# Patient Record
Sex: FEMALE | Race: BLACK OR AFRICAN AMERICAN
[De-identification: names, ages, dates, MRNs, and addresses within clinical notes are randomized per-mention and may not be internally consistent; named-entity substitution may affect disease eponyms.]

---

## 2019-10-05 ENCOUNTER — HOSPITAL ENCOUNTER (EMERGENCY)
Dept: HOSPITAL 74 - JER | Age: 74
Discharge: HOME | End: 2019-10-05
Payer: COMMERCIAL

## 2019-10-05 VITALS — BODY MASS INDEX: 27.3 KG/M2

## 2019-10-05 VITALS — SYSTOLIC BLOOD PRESSURE: 182 MMHG | DIASTOLIC BLOOD PRESSURE: 82 MMHG | TEMPERATURE: 97.9 F | HEART RATE: 91 BPM
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
## 2019-10-05 DIAGNOSIS — M25.511: Primary | ICD-10-CM
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
## 2019-10-05 LAB
APPEARANCE UR: CLEAR
BILIRUB UR STRIP.AUTO-MCNC: NEGATIVE MG/DL
COLOR UR: YELLOW
KETONES UR QL STRIP: NEGATIVE
LEUKOCYTE ESTERASE UR QL STRIP.AUTO: NEGATIVE
NITRITE UR QL STRIP: NEGATIVE
PH UR: 5.5 [PH] (ref 5–8)
PROT UR QL STRIP: (no result)
PROT UR QL STRIP: (no result)
SP GR UR: 1.03 (ref 1.01–1.03)
UROBILINOGEN UR STRIP-MCNC: 0.2 MG/DL (ref 0.2–1)

## 2019-10-05 PROCEDURE — 3E0234Z INTRODUCTION OF SERUM, TOXOID AND VACCINE INTO MUSCLE, PERCUTANEOUS APPROACH: ICD-10-PCS

## 2019-10-05 NOTE — PDOC
History of Present Illness





- General


Chief Complaint: Pain, Acute


Stated Complaint: FALL ON RT SHOULDER


Time Seen by Provider: 10/05/19 11:41


History Source: Patient


Exam Limitations: No Limitations





Past History





- Travel


Traveled outside of the country in the last 30 days: No


Close contact w/someone who was outside of country & ill: No





- Past Medical History


Allergies/Adverse Reactions: 


 Allergies











Allergy/AdvReac Type Severity Reaction Status Date / Time


 


No Known Allergies Allergy   Verified 10/05/19 11:40














- Psycho Social/Smoking Cessation Hx


Smoking History: Never smoked


Hx Alcohol Use: No


Drug/Substance Use Hx: No





**Review of Systems





- Review of Systems


Able to Perform ROS?: Yes


Comments:: 





10/05/19 14:42


CONSTITUTIONAL: 


Absent: fever, chills, diaphoresis, generalized weakness, malaise, loss of 

appetite


HEENT: 


Absent: rhinorrhea, nasal congestion, throat pain, throat swelling, difficulty 

swallowing, mouth swelling, ear pain, eye pain, visual Changes


CARDIOVASCULAR: 


Absent: chest pain, loss of consciousness, palpitations, irregular heart rate, 

peripheral edema


RESPIRATORY: 


Absent: cough, shortness of breath, dyspnea with exertion, orthopnea, wheezing, 

stridor, hemoptysis


GASTROINTESTINAL:


Absent: abdominal pain, abdominal distension, nausea, vomiting, diarrhea, 

constipation, melena, hematochezia


GENITOURINARY: 


Absent: dysuria, frequency, urgency, hesitancy, hematuria, flank pain, genital 

pain


MUSCULOSKELETAL: 


Absent: myalgia, arthralgia, joint swelling


SKIN: 


Absent: rash, itching, pallor


HEMATOLOGIC/IMMUNOLOGIC: 


Absent: easy bleeding, easy bruising, lymphadenopathy, frequent infections


ENDOCRINE:


Absent: unexplained weight gain, unexplained weight loss, heat intolerance, 

cold intolerance


NEUROLOGIC: 


Absent: headache, focal weakness or paresthesias, dizziness, unsteady gait, 

seizure, mental status changes, bladder or bowel incontinence


PSYCHIATRIC: 


Absent: anxiety, depression, suicidal or homicidal ideation, hallucinations.


Is the patient limited English proficient: No





*Physical Exam





- Vital Signs


 Last Vital Signs











Temp Pulse Resp BP Pulse Ox


 


 97.9 F   91 H  16   182/82 H  98 


 


 10/05/19 11:40  10/05/19 11:40  10/05/19 11:40  10/05/19 11:40  10/05/19 11:40














ED Treatment Course





- RADIOLOGY


Radiology Studies Ordered: 














 Category Date Time Status


 


 HEAD CT WITHOUT CONTRAST [CT] Stat CT Scan  10/05/19 12:33 Ordered


 


 SHOULDER W/TRANS-RIGHT [RAD] Stat Radiology  10/05/19 11:46 Completed














Discharge





- Discharge Information


Problems reviewed: Yes


Clinical Impression/Diagnosis: 


Shoulder pain


Qualifiers:


 Chronicity: acute Laterality: right Qualified Code(s): M25.511 - Pain in right 

shoulder





Condition: Stable


Disposition: HOME





- Admission


No





- Follow up/Referral


Referrals: 


Unruly Leyva MD [Primary Care Provider] - 





- Patient Discharge Instructions


Patient Printed Discharge Instructions:  DI for Shoulder Pain


Additional Instructions: 


You were evaluated for your fall today.


Your CAT scan and urine were normal today.


Your x-ray of your right shoulder also did not show any broken bones and is 

normal.


I suspect she may have injured her rotator cuff.


Please take Tylenol 650 mg every 4 hours for pain.  Do not take more than 4000 

mg a day.


You may wear the sling as needed for comfort


Please follow-up with orthopedics in 2 to 3 days.  A referral has been provided 

for you.





Return to the ER for worsening pain, lightheadedness, dizziness or if you have 

any changes in your symptoms.





- Post Discharge Activity

## 2022-05-25 ENCOUNTER — INPATIENT (INPATIENT)
Facility: HOSPITAL | Age: 77
LOS: 1 days | Discharge: HOME | DRG: 244 | End: 2022-05-27
Attending: INTERNAL MEDICINE | Admitting: INTERNAL MEDICINE
Payer: MEDICARE

## 2022-05-25 VITALS
HEIGHT: 60 IN | DIASTOLIC BLOOD PRESSURE: 74 MMHG | TEMPERATURE: 98 F | HEART RATE: 44 BPM | SYSTOLIC BLOOD PRESSURE: 229 MMHG | OXYGEN SATURATION: 100 % | WEIGHT: 130.07 LBS | RESPIRATION RATE: 16 BRPM

## 2022-05-25 DIAGNOSIS — E78.5 HYPERLIPIDEMIA, UNSPECIFIED: ICD-10-CM

## 2022-05-25 DIAGNOSIS — D64.9 ANEMIA, UNSPECIFIED: ICD-10-CM

## 2022-05-25 DIAGNOSIS — R63.0 ANOREXIA: ICD-10-CM

## 2022-05-25 DIAGNOSIS — I44.1 ATRIOVENTRICULAR BLOCK, SECOND DEGREE: ICD-10-CM

## 2022-05-25 DIAGNOSIS — E11.9 TYPE 2 DIABETES MELLITUS WITHOUT COMPLICATIONS: ICD-10-CM

## 2022-05-25 DIAGNOSIS — I10 ESSENTIAL (PRIMARY) HYPERTENSION: ICD-10-CM

## 2022-05-25 PROBLEM — Z00.00 ENCOUNTER FOR PREVENTIVE HEALTH EXAMINATION: Status: ACTIVE | Noted: 2022-05-25

## 2022-05-25 LAB
ALBUMIN SERPL ELPH-MCNC: 3.6 G/DL — SIGNIFICANT CHANGE UP (ref 3.3–5)
ALP SERPL-CCNC: 68 U/L — SIGNIFICANT CHANGE UP (ref 40–120)
ALT FLD-CCNC: 13 U/L — SIGNIFICANT CHANGE UP (ref 10–45)
ANION GAP SERPL CALC-SCNC: 10 MMOL/L — SIGNIFICANT CHANGE UP (ref 5–17)
APTT BLD: 27.3 SEC — LOW (ref 27.5–35.5)
AST SERPL-CCNC: 24 U/L — SIGNIFICANT CHANGE UP (ref 10–40)
BASOPHILS # BLD AUTO: 0.01 K/UL — SIGNIFICANT CHANGE UP (ref 0–0.2)
BASOPHILS NFR BLD AUTO: 0.2 % — SIGNIFICANT CHANGE UP (ref 0–2)
BILIRUB SERPL-MCNC: 0.2 MG/DL — SIGNIFICANT CHANGE UP (ref 0.2–1.2)
BUN SERPL-MCNC: 17 MG/DL — SIGNIFICANT CHANGE UP (ref 7–23)
CALCIUM SERPL-MCNC: 9.8 MG/DL — SIGNIFICANT CHANGE UP (ref 8.4–10.5)
CHLORIDE SERPL-SCNC: 105 MMOL/L — SIGNIFICANT CHANGE UP (ref 96–108)
CK SERPL-CCNC: 240 U/L — HIGH (ref 25–170)
CO2 SERPL-SCNC: 23 MMOL/L — SIGNIFICANT CHANGE UP (ref 22–31)
CREAT SERPL-MCNC: 1.2 MG/DL — SIGNIFICANT CHANGE UP (ref 0.5–1.3)
EGFR: 47 ML/MIN/1.73M2 — LOW
EOSINOPHIL # BLD AUTO: 0.08 K/UL — SIGNIFICANT CHANGE UP (ref 0–0.5)
EOSINOPHIL NFR BLD AUTO: 1.3 % — SIGNIFICANT CHANGE UP (ref 0–6)
GLUCOSE BLDC GLUCOMTR-MCNC: 127 MG/DL — HIGH (ref 70–99)
GLUCOSE BLDC GLUCOMTR-MCNC: 88 MG/DL — SIGNIFICANT CHANGE UP (ref 70–99)
GLUCOSE SERPL-MCNC: 130 MG/DL — HIGH (ref 70–99)
HCT VFR BLD CALC: 33.4 % — LOW (ref 34.5–45)
HGB BLD-MCNC: 10.7 G/DL — LOW (ref 11.5–15.5)
IMM GRANULOCYTES NFR BLD AUTO: 0.2 % — SIGNIFICANT CHANGE UP (ref 0–1.5)
INR BLD: 1.12 — SIGNIFICANT CHANGE UP (ref 0.88–1.16)
LYMPHOCYTES # BLD AUTO: 2.53 K/UL — SIGNIFICANT CHANGE UP (ref 1–3.3)
LYMPHOCYTES # BLD AUTO: 41.2 % — SIGNIFICANT CHANGE UP (ref 13–44)
MCHC RBC-ENTMCNC: 29.3 PG — SIGNIFICANT CHANGE UP (ref 27–34)
MCHC RBC-ENTMCNC: 32 GM/DL — SIGNIFICANT CHANGE UP (ref 32–36)
MCV RBC AUTO: 91.5 FL — SIGNIFICANT CHANGE UP (ref 80–100)
MONOCYTES # BLD AUTO: 0.63 K/UL — SIGNIFICANT CHANGE UP (ref 0–0.9)
MONOCYTES NFR BLD AUTO: 10.3 % — SIGNIFICANT CHANGE UP (ref 2–14)
NEUTROPHILS # BLD AUTO: 2.88 K/UL — SIGNIFICANT CHANGE UP (ref 1.8–7.4)
NEUTROPHILS NFR BLD AUTO: 46.8 % — SIGNIFICANT CHANGE UP (ref 43–77)
NRBC # BLD: 0 /100 WBCS — SIGNIFICANT CHANGE UP (ref 0–0)
NT-PROBNP SERPL-SCNC: 780 PG/ML — HIGH (ref 0–300)
PLATELET # BLD AUTO: 224 K/UL — SIGNIFICANT CHANGE UP (ref 150–400)
POTASSIUM SERPL-MCNC: 4.4 MMOL/L — SIGNIFICANT CHANGE UP (ref 3.5–5.3)
POTASSIUM SERPL-SCNC: 4.4 MMOL/L — SIGNIFICANT CHANGE UP (ref 3.5–5.3)
PROT SERPL-MCNC: 7.5 G/DL — SIGNIFICANT CHANGE UP (ref 6–8.3)
PROTHROM AB SERPL-ACNC: 13.3 SEC — SIGNIFICANT CHANGE UP (ref 10.5–13.4)
RBC # BLD: 3.65 M/UL — LOW (ref 3.8–5.2)
RBC # FLD: 12.9 % — SIGNIFICANT CHANGE UP (ref 10.3–14.5)
SARS-COV-2 RNA SPEC QL NAA+PROBE: NEGATIVE — SIGNIFICANT CHANGE UP
SODIUM SERPL-SCNC: 138 MMOL/L — SIGNIFICANT CHANGE UP (ref 135–145)
TROPONIN T SERPL-MCNC: 0.01 NG/ML — SIGNIFICANT CHANGE UP (ref 0–0.01)
WBC # BLD: 6.14 K/UL — SIGNIFICANT CHANGE UP (ref 3.8–10.5)
WBC # FLD AUTO: 6.14 K/UL — SIGNIFICANT CHANGE UP (ref 3.8–10.5)

## 2022-05-25 PROCEDURE — 99285 EMERGENCY DEPT VISIT HI MDM: CPT | Mod: 25

## 2022-05-25 PROCEDURE — 71045 X-RAY EXAM CHEST 1 VIEW: CPT | Mod: 26

## 2022-05-25 PROCEDURE — 99222 1ST HOSP IP/OBS MODERATE 55: CPT

## 2022-05-25 PROCEDURE — 93010 ELECTROCARDIOGRAM REPORT: CPT

## 2022-05-25 RX ORDER — SEMAGLUTIDE 0.68 MG/ML
1 INJECTION, SOLUTION SUBCUTANEOUS
Qty: 0 | Refills: 0 | DISCHARGE

## 2022-05-25 RX ORDER — MIRTAZAPINE 45 MG/1
7.5 TABLET, ORALLY DISINTEGRATING ORAL AT BEDTIME
Refills: 0 | Status: DISCONTINUED | OUTPATIENT
Start: 2022-05-25 | End: 2022-05-27

## 2022-05-25 RX ORDER — INSULIN GLARGINE 100 [IU]/ML
15 INJECTION, SOLUTION SUBCUTANEOUS AT BEDTIME
Refills: 0 | Status: DISCONTINUED | OUTPATIENT
Start: 2022-05-25 | End: 2022-05-27

## 2022-05-25 RX ORDER — MIRTAZAPINE 45 MG/1
7.5 TABLET, ORALLY DISINTEGRATING ORAL AT BEDTIME
Refills: 0 | Status: DISCONTINUED | OUTPATIENT
Start: 2022-05-25 | End: 2022-05-25

## 2022-05-25 RX ORDER — DEXTROSE 50 % IN WATER 50 %
25 SYRINGE (ML) INTRAVENOUS ONCE
Refills: 0 | Status: DISCONTINUED | OUTPATIENT
Start: 2022-05-25 | End: 2022-05-27

## 2022-05-25 RX ORDER — MIRTAZAPINE 45 MG/1
1 TABLET, ORALLY DISINTEGRATING ORAL
Qty: 0 | Refills: 0 | DISCHARGE

## 2022-05-25 RX ORDER — DEXTROSE 50 % IN WATER 50 %
12.5 SYRINGE (ML) INTRAVENOUS ONCE
Refills: 0 | Status: DISCONTINUED | OUTPATIENT
Start: 2022-05-25 | End: 2022-05-27

## 2022-05-25 RX ORDER — ASPIRIN/CALCIUM CARB/MAGNESIUM 324 MG
81 TABLET ORAL DAILY
Refills: 0 | Status: DISCONTINUED | OUTPATIENT
Start: 2022-05-25 | End: 2022-05-27

## 2022-05-25 RX ORDER — LOSARTAN POTASSIUM 100 MG/1
100 TABLET, FILM COATED ORAL EVERY 24 HOURS
Refills: 0 | Status: DISCONTINUED | OUTPATIENT
Start: 2022-05-25 | End: 2022-05-27

## 2022-05-25 RX ORDER — GLUCAGON INJECTION, SOLUTION 0.5 MG/.1ML
1 INJECTION, SOLUTION SUBCUTANEOUS ONCE
Refills: 0 | Status: DISCONTINUED | OUTPATIENT
Start: 2022-05-25 | End: 2022-05-27

## 2022-05-25 RX ORDER — ATORVASTATIN CALCIUM 80 MG/1
10 TABLET, FILM COATED ORAL AT BEDTIME
Refills: 0 | Status: DISCONTINUED | OUTPATIENT
Start: 2022-05-25 | End: 2022-05-27

## 2022-05-25 RX ORDER — EZETIMIBE 10 MG/1
1 TABLET ORAL
Qty: 0 | Refills: 0 | DISCHARGE

## 2022-05-25 RX ORDER — INSULIN GLARGINE 100 [IU]/ML
30 INJECTION, SOLUTION SUBCUTANEOUS
Qty: 0 | Refills: 0 | DISCHARGE

## 2022-05-25 RX ORDER — SITAGLIPTIN AND METFORMIN HYDROCHLORIDE 500; 50 MG/1; MG/1
1 TABLET, FILM COATED ORAL
Qty: 0 | Refills: 0 | DISCHARGE

## 2022-05-25 RX ORDER — MIRTAZAPINE 45 MG/1
7.5 TABLET, ORALLY DISINTEGRATING ORAL DAILY
Refills: 0 | Status: DISCONTINUED | OUTPATIENT
Start: 2022-05-25 | End: 2022-05-25

## 2022-05-25 RX ORDER — AMLODIPINE BESYLATE 2.5 MG/1
10 TABLET ORAL DAILY
Refills: 0 | Status: DISCONTINUED | OUTPATIENT
Start: 2022-05-25 | End: 2022-05-25

## 2022-05-25 RX ORDER — OXYBUTYNIN CHLORIDE 5 MG
1 TABLET ORAL
Qty: 0 | Refills: 0 | DISCHARGE

## 2022-05-25 RX ORDER — DEXTROSE 50 % IN WATER 50 %
15 SYRINGE (ML) INTRAVENOUS ONCE
Refills: 0 | Status: DISCONTINUED | OUTPATIENT
Start: 2022-05-25 | End: 2022-05-27

## 2022-05-25 RX ORDER — SODIUM CHLORIDE 9 MG/ML
1000 INJECTION, SOLUTION INTRAVENOUS
Refills: 0 | Status: DISCONTINUED | OUTPATIENT
Start: 2022-05-25 | End: 2022-05-27

## 2022-05-25 RX ORDER — INSULIN LISPRO 100/ML
VIAL (ML) SUBCUTANEOUS
Refills: 0 | Status: DISCONTINUED | OUTPATIENT
Start: 2022-05-25 | End: 2022-05-27

## 2022-05-25 RX ORDER — ASPIRIN/CALCIUM CARB/MAGNESIUM 324 MG
1 TABLET ORAL
Qty: 0 | Refills: 0 | DISCHARGE

## 2022-05-25 RX ORDER — OXYBUTYNIN CHLORIDE 5 MG
5 TABLET ORAL DAILY
Refills: 0 | Status: DISCONTINUED | OUTPATIENT
Start: 2022-05-25 | End: 2022-05-27

## 2022-05-25 RX ORDER — HYDROCHLOROTHIAZIDE 25 MG
25 TABLET ORAL EVERY 24 HOURS
Refills: 0 | Status: DISCONTINUED | OUTPATIENT
Start: 2022-05-25 | End: 2022-05-27

## 2022-05-25 RX ORDER — LOSARTAN/HYDROCHLOROTHIAZIDE 100MG-25MG
1 TABLET ORAL
Qty: 0 | Refills: 0 | DISCHARGE

## 2022-05-25 RX ADMIN — AMLODIPINE BESYLATE 10 MILLIGRAM(S): 2.5 TABLET ORAL at 15:46

## 2022-05-25 RX ADMIN — INSULIN GLARGINE 15 UNIT(S): 100 INJECTION, SOLUTION SUBCUTANEOUS at 22:52

## 2022-05-25 RX ADMIN — ATORVASTATIN CALCIUM 10 MILLIGRAM(S): 80 TABLET, FILM COATED ORAL at 22:52

## 2022-05-25 RX ADMIN — Medication 25 MILLIGRAM(S): at 16:52

## 2022-05-25 RX ADMIN — Medication 5 MILLIGRAM(S): at 16:50

## 2022-05-25 RX ADMIN — Medication 81 MILLIGRAM(S): at 16:50

## 2022-05-25 RX ADMIN — LOSARTAN POTASSIUM 100 MILLIGRAM(S): 100 TABLET, FILM COATED ORAL at 17:03

## 2022-05-25 RX ADMIN — MIRTAZAPINE 7.5 MILLIGRAM(S): 45 TABLET, ORALLY DISINTEGRATING ORAL at 22:52

## 2022-05-25 NOTE — H&P ADULT - PROBLEM SELECTOR PLAN 5
H/H notable for 10.7/33.4 -- denies melena, hematuria, BRBPR  -AM iron studies, B12, folate  -Active T&S

## 2022-05-25 NOTE — H&P ADULT - PROBLEM SELECTOR PLAN 6
Pt endorsing loss of appetite over the past year with 20lb weight loss  -PCP aware and prescribed Mirtazapine 7.5mg QD for appetite stimulation however pt ran out  -Nutrition consult  -C/w Mirtazapine on admission     DVT PPX: SCDs  Dispo: pending PPM placement  Case d/w Dr. Mckeon

## 2022-05-25 NOTE — ED ADULT NURSE NOTE - OBJECTIVE STATEMENT
pt. a&ox4 ambulatory sent into ED by cardiologist after halter monitor placed on left chest wall yesterday for monitoring. Pt. reports she was at her pcp office for routine  checkup yesterday, pt. was told her hr was slower than baseline, sent her to cardiologist, where they placed halter, and called her this am after seeing abnormal activity. Pt. denies s/s of dizziness, lightheadedness, fatigue / weakness, sob, cp, palpitations, headache, heartburn, abdominal pain. Pt. denies n/v/d, f/c. Pt. airway patent, breathing spontaneous and unlabored. Pt. UG to MD sales, pt. placed on ccm, pulseox. Pt. hypertensive @ triage, denies taking her medications this am because of the call from the cardiologist. Pt. reports compliance qd otherwise.

## 2022-05-25 NOTE — ED ADULT TRIAGE NOTE - CHIEF COMPLAINT QUOTE
Pt sent by MD for low heart rate, pt denies any symptoms, no chest pain, sob, dizziness, nausea. Hx of HTN, did not take meds today, denies headache.

## 2022-05-25 NOTE — H&P ADULT - PROBLEM SELECTOR PLAN 2
Pt with elevated BPs to 229/74 initially in ER - did not take any meds today AM  -S/p Amlodipine 10mg x1 in ER   -C/w home Losartan 100mg, HCTZ 25mg QD and additional Amlodipine 10mg QD Pt with elevated BPs to 229/74 initially in ER - did not take any meds today AM  -S/p Amlodipine 10mg x1 in ER   -C/w home Losartan 100mg, HCTZ 25mg QD. Can order additional Amlodipine 10mg QD if she continues to be hypertensive

## 2022-05-25 NOTE — H&P ADULT - PROBLEM SELECTOR PLAN 4
Holding home Janumet 50-1000mg BID and Ozempic on admission  -C/w MARQUIS and (1/2) home Lantus 15U QD  -AM A1c

## 2022-05-25 NOTE — PATIENT PROFILE ADULT - NSPROREFERSVCHOMEDIABETES_GEN_A_NUR
Patient presented to [address] [place] [address] ED in full cardiac arrest (occurred within a minute of arrival to EMS bay), patient had recently deteriorated and was brought in by EMS for initial complaint of SOB and syncopal episode. no

## 2022-05-25 NOTE — ED PROVIDER NOTE - CPE EDP EYES NORM
I would recommend you begin ranitidine (Zantac) 150mg orally twice daily.  Take your first dose this evening.  Please also begin the omeprazole 20mg once daily,  This is taken in the morning on an empty stomach.   After 4 days or so you can stop the ranitidine and continue the omeprazole.    Please also call and schedule the endoscopy for next week.    I placed a new referral for GYN to address the pain with intercourse.  I also refilled the clobetasol, please restart that.    If you feel your symptoms change or worsen please do not hesitate to seek additional emergent care.       normal...

## 2022-05-25 NOTE — ED ADULT NURSE REASSESSMENT NOTE - NS ED NURSE REASSESS COMMENT FT1
pt. HR ranging between 37-40 bpm as seen on ccm. Pt. placed on Zoll monitor and acls pads. MSD mónica aware. Pt. resting comfortably, denies s/s of dizziness, lightheadedness, fatigue, palpitations, diaphoresis, cp. sob. will continue to assess.

## 2022-05-25 NOTE — H&P ADULT - ASSESSMENT
78 yo F with a PMH of HTN, HLD, DM who presented to [facility] ED after worsening RAMIREZ/fatigue with Holter monitor reveling second degree type II heart block with HRs upper 30s-40s. Pt states she is currently and has always been ASX, however EP consulted with plan for admission to cardiac tele and PPM in AM.

## 2022-05-25 NOTE — PATIENT PROFILE ADULT - FALL HARM RISK - HARM RISK INTERVENTIONS

## 2022-05-25 NOTE — H&P ADULT - PSYCHIATRIC
Affect and characteristics of appearance, verbalizations, behaviors are appropriate PAST SURGICAL HISTORY:  S/P angioplasty with stent 1 stent, RCA    Status Post Cardiac Catheterization

## 2022-05-25 NOTE — CONSULT NOTE ADULT - SUBJECTIVE AND OBJECTIVE BOX
HPI:    Ms. Rose is a 77 yo F with HTN, DM, and HLD who was recently given an event monitor for bradycardia, recording noted to be in 2:1 AV block with rates 30-40s. She was encouraged to come to ED because of this finding, she currently denies chest discomfort or pain, and SOB. Pt endorses increased fatigue with exertion and h/o dizziness. Her BP is 220/90s in the ED, denies HA, blurry vision. EP consulted for PPM evaluation.    EKG in the ED shows her to be in a 2:1 AV block @ 41 bpm. Tele shows her rates ranging 30-50s bpm. SBPs have been in 200s. She endorses bilateral ankle and foot edema.    Of note, she was seen by EP on 21 for which she was given a home monitor and was noted to have an asymptomatic intermittent 2:1 heart block- wenkebach at 3am and 6 am (hours of sleep). Recommendation at that time was to repeat a monitor 6 months.      PAST MEDICAL & SURGICAL HISTORY:  HTN  DM  HLD    Social History:   no smoking, no drugs, no alcohol    Home medications:  Unsure of which medications she takes; she showed me a list: losartan, insulin, atorvastatin, zetia, ASA listed-- admits to not taking meds recently.    Inpatient Medications:   none in system    Allergies: No Known Allergies    ROS:   CONSTITUTIONAL: No fever, weight loss + fatigue  EYES: Pt denies  RESPIRATORY: No cough, wheezing, chills or hemoptysis; No Shortness of Breath  CARDIOVASCULAR: see HPI  GASTROINTESTINAL: Pt denies  NEUROLOGICAL: Pt denies  SKIN: Pt denies   PSYCHIATRIC: Pt denies  HEME/LYMPH: Pt denies    PHYSICAL:  T(C): 37.1 (22 @ 13:43), Max: 37.1 (22 @ 13:43)  HR: 38 (22 @ 13:43) (38 - 44)  BP: 205/86 (22 @ 13:40) (205/86 - 229/74)  RR: 18 (22 @ 13:43) (16 - 18)  SpO2: 100% (22 @ 13:43) (100% - 100%)    Appearance: No acute distress, well developed  Eyes: normal appearing conjunctiva, pupils and eyelids  Cardiovascular: Normal S1 S2, No JVD, No murmurs, No edema  Respiratory: Lungs clear to auscultation	bilaterally.  No wheeze, rhonchi, rales noted  Gastrointestinal:  Soft, NT/ND 	  Neurologic:  No deficit noted  Psych: A&Ox3, normal mood/affect  Musculoskeletal: normal gait  Skin: no rash noted, normal color and pigmentation.      LABS:                      10.7   6.14  )-----------( 224      ( 25 May 2022 13:03 )             33.4   138  |  105  |  17  ----------------------------<  130<H>  4.4   |  23  |  1.20    Ca    9.8      25 May 2022 13:03    TPro  7.5  /  Alb  3.6  /  TBili  0.2  /  DBili  x   /  AST  24  /  ALT  13  /  AlkPhos  68  05-25    PT/INR - ( 25 May 2022 13:03 )   PT: 13.3 sec;   INR: 1.12     PTT - ( 25 May 2022 13:03 )  PTT:27.3 sec  Troponin T, Serum (22 @ 13:03) Serum: 0.01:   EK22: 2:1 AV block @ 41 bpm    Telemetry: 2:1 AV block 30-50s    ECHO: none    Prior EP procedures: none    Cath / stress / Cardiac CTa: none    Assessment & Plan:  Ms. Rose is a 77 yo F with HTN, DM, and HLD who was recently given an event monitor for bradycardia, recording noted to be in 2:1 AV block with rates 30-40s. She was encouraged to come to ED now w/ c/o increased fatigue with exertion and h/o dizziness. Her BP is 220/90s in the ED, denies HA, blurry vision. EP consulted for PPM evaluation.    - admit to cardiology to manage HTN  - Likely dual chamber PPM tomorrow  - Keep NPO after Midnight  - covid swab  - needs TTE     HPI:    Ms. Rose is a 77 yo F with HTN, DM, and HLD who was recently given an event monitor for bradycardia, recording noted to be in 2:1 AV block with rates 30-40s. She was encouraged to come to ED because of this finding, she currently denies chest discomfort or pain, and SOB. Pt endorses increased fatigue with exertion and h/o dizziness. Her BP is 220/90s in the ED, denies HA, blurry vision. EP consulted for PPM evaluation.    EKG in the ED shows her to be in a 2:1 AV block @ 41 bpm. Tele shows her rates ranging 30-50s bpm. SBPs have been in 200s. She endorses bilateral ankle and foot edema.    Of note, she was seen by EP on 21 for which she was given a home monitor and was noted to have an asymptomatic intermittent 2:1 heart block- wenkebach at 3am and 6 am (hours of sleep). Recommendation at that time was to repeat a monitor 6 months.      PAST MEDICAL & SURGICAL HISTORY:  HTN  DM  HLD    Social History:   no smoking, no drugs, no alcohol    Home medications:  Unsure of which medications she takes; she showed me a list: losartan, insulin, atorvastatin, zetia, ASA listed-- admits to not taking meds recently.    Inpatient Medications:   none in system    Allergies: No Known Allergies    ROS:   CONSTITUTIONAL: No fever, weight loss + fatigue  EYES: Pt denies  RESPIRATORY: No cough, wheezing, chills or hemoptysis; No Shortness of Breath  CARDIOVASCULAR: see HPI  GASTROINTESTINAL: Pt denies  NEUROLOGICAL: Pt denies  SKIN: Pt denies   PSYCHIATRIC: Pt denies  HEME/LYMPH: Pt denies    PHYSICAL:  T(C): 37.1 (22 @ 13:43), Max: 37.1 (22 @ 13:43)  HR: 38 (22 @ 13:43) (38 - 44)  BP: 205/86 (22 @ 13:40) (205/86 - 229/74)  RR: 18 (22 @ 13:43) (16 - 18)  SpO2: 100% (22 @ 13:43) (100% - 100%)    Appearance: No acute distress, well developed  Eyes: normal appearing conjunctiva, pupils and eyelids  Cardiovascular: Normal S1 S2, No JVD, No murmurs, No edema  Respiratory: Lungs clear to auscultation	bilaterally.  No wheeze, rhonchi, rales noted  Gastrointestinal:  Soft, NT/ND 	  Neurologic:  No deficit noted  Psych: A&Ox3, normal mood/affect  Musculoskeletal: normal gait  Skin: no rash noted, normal color and pigmentation.      LABS:                      10.7   6.14  )-----------( 224      ( 25 May 2022 13:03 )             33.4   138  |  105  |  17  ----------------------------<  130<H>  4.4   |  23  |  1.20    Ca    9.8      25 May 2022 13:03    TPro  7.5  /  Alb  3.6  /  TBili  0.2  /  DBili  x   /  AST  24  /  ALT  13  /  AlkPhos  68  05-25    PT/INR - ( 25 May 2022 13:03 )   PT: 13.3 sec;   INR: 1.12     PTT - ( 25 May 2022 13:03 )  PTT:27.3 sec  Troponin T, Serum (22 @ 13:03) Serum: 0.01:   EK22: 2:1 AV block @ 41 bpm    Telemetry: 2:1 AV block 30-50s    ECHO: none    Prior EP procedures: none    Cath / stress / Cardiac CTa: none    Assessment & Plan:  Ms. Rose is a 77 yo F with HTN, DM, and HLD who was recently given an event monitor for bradycardia, recording noted to be in 2:1 AV block with rates 30-40s. She was encouraged to come to ED now w/ c/o increased fatigue with exertion and h/o dizziness. Her BP is 220/90s in the ED, denies HA, blurry vision. EP consulted for PPM evaluation. Given 2:1 block <40 bpm pt meets criteria for PPM    - admit to cardiology to manage HTN  - Likely dual chamber PPM tomorrow  - Keep NPO after Midnight  - covid swab  - needs TTE

## 2022-05-25 NOTE — H&P ADULT - HISTORY OF PRESENT ILLNESS
Cardiologist: none  Pharmacy: [facility] verified w/ patient's list and cross referenced w/ [employer] (reliable)     78 yo F with a PMH of HTN, HLD, DM who presented to [facility] ED after abnormal Holter monitor results. Pt was referred to outpatient EP Dr. Mccord for bradycardia, ?syncope and unspecified heart block on ECG. Holter monitor at that time (4/2021) revealed Wenckebach and AV juan blocking agents held. Per MD note, patient continued f/u with PCP and endorsed worsening fatigue and RAMIREZ for which repeat Holter conducted. Holter 5/2022 was significant for second degree type II heart block with HRs upper 30s-40s. Pt states she is currently and has always been ASX, however was referred to ER by PCP for EP eval.    In the ER, /74, HR 44, RR 16, SpO2 100% RA. Labs significant for H/H 10.7/33.4, , Troponin negative x1. CXR with no acute changes. ECG: SR @ 41bpm with 2nd degree type II heart block (). EP consulted, plan for admission to cardiac tele with plan for PPM in AM.

## 2022-05-25 NOTE — H&P ADULT - PROBLEM SELECTOR PLAN 1
Per MD, endorsing worsening RAMIREZ/fatigue over past few weeks - pt states shes ASX  -Holter Monitor 4/2021 (Dr. Corral) revealing Wenckebach   -Holter Monitor 5/2022 -- second degree heart block type II w/ HRs upper 30s-40s  -ECG: SR @ 41bpm with 2nd degree type II heart block (), no ST changes or TWI  -Troponin negative x1  -F/u AM TFTs  -Echocardiogram ordered, f/u results  -EP consulted: NPO after midnight for likely dual chamber in AM  -Pacer pads, atropine at bedside, strict tele monitoring

## 2022-05-25 NOTE — ED PROVIDER NOTE - CHIEF COMPLAINT
The patient is a 77y Female complaining of 
lower abdomen
Alert and oriented to person, place and time

## 2022-05-25 NOTE — ED PROVIDER NOTE - OBJECTIVE STATEMENT
77 F w ho DM, HTN, high lipids - w low hr- px denies any/all sx- no sob no cp/no n/v no weakness  px was wearing halter monitor- placed by MD due to low hr- found to be in 2:1 heart block  no beta blocker/ca channel blocker  mod-severe  no sig exac/allev factors  no meds taken this am

## 2022-05-26 DIAGNOSIS — E11.9 TYPE 2 DIABETES MELLITUS WITHOUT COMPLICATIONS: ICD-10-CM

## 2022-05-26 DIAGNOSIS — I16.0 HYPERTENSIVE URGENCY: ICD-10-CM

## 2022-05-26 DIAGNOSIS — N18.30 CHRONIC KIDNEY DISEASE, STAGE 3 UNSPECIFIED: ICD-10-CM

## 2022-05-26 LAB
A1C WITH ESTIMATED AVERAGE GLUCOSE RESULT: 6.5 % — HIGH (ref 4–5.6)
ALBUMIN SERPL ELPH-MCNC: 3.2 G/DL — LOW (ref 3.3–5)
ALP SERPL-CCNC: 63 U/L — SIGNIFICANT CHANGE UP (ref 40–120)
ALT FLD-CCNC: 11 U/L — SIGNIFICANT CHANGE UP (ref 10–45)
ANION GAP SERPL CALC-SCNC: 10 MMOL/L — SIGNIFICANT CHANGE UP (ref 5–17)
AST SERPL-CCNC: 22 U/L — SIGNIFICANT CHANGE UP (ref 10–40)
BILIRUB SERPL-MCNC: 0.2 MG/DL — SIGNIFICANT CHANGE UP (ref 0.2–1.2)
BLD GP AB SCN SERPL QL: NEGATIVE — SIGNIFICANT CHANGE UP
BLD GP AB SCN SERPL QL: NEGATIVE — SIGNIFICANT CHANGE UP
BUN SERPL-MCNC: 26 MG/DL — HIGH (ref 7–23)
CALCIUM SERPL-MCNC: 9.2 MG/DL — SIGNIFICANT CHANGE UP (ref 8.4–10.5)
CHLORIDE SERPL-SCNC: 103 MMOL/L — SIGNIFICANT CHANGE UP (ref 96–108)
CHOLEST SERPL-MCNC: 173 MG/DL — SIGNIFICANT CHANGE UP
CK MB CFR SERPL CALC: 2.1 NG/ML — SIGNIFICANT CHANGE UP (ref 0–6.7)
CK SERPL-CCNC: 166 U/L — SIGNIFICANT CHANGE UP (ref 25–170)
CO2 SERPL-SCNC: 23 MMOL/L — SIGNIFICANT CHANGE UP (ref 22–31)
CREAT SERPL-MCNC: 1.35 MG/DL — HIGH (ref 0.5–1.3)
EGFR: 40 ML/MIN/1.73M2 — LOW
ESTIMATED AVERAGE GLUCOSE: 140 MG/DL — HIGH (ref 68–114)
FERRITIN SERPL-MCNC: 65 NG/ML — SIGNIFICANT CHANGE UP (ref 15–150)
FOLATE SERPL-MCNC: 12.7 NG/ML — SIGNIFICANT CHANGE UP
GLUCOSE BLDC GLUCOMTR-MCNC: 106 MG/DL — HIGH (ref 70–99)
GLUCOSE BLDC GLUCOMTR-MCNC: 127 MG/DL — HIGH (ref 70–99)
GLUCOSE BLDC GLUCOMTR-MCNC: 136 MG/DL — HIGH (ref 70–99)
GLUCOSE BLDC GLUCOMTR-MCNC: 82 MG/DL — SIGNIFICANT CHANGE UP (ref 70–99)
GLUCOSE SERPL-MCNC: 136 MG/DL — HIGH (ref 70–99)
HCT VFR BLD CALC: 32.4 % — LOW (ref 34.5–45)
HCV AB S/CO SERPL IA: 0.05 S/CO — SIGNIFICANT CHANGE UP
HCV AB SERPL-IMP: SIGNIFICANT CHANGE UP
HDLC SERPL-MCNC: 51 MG/DL — SIGNIFICANT CHANGE UP
HGB BLD-MCNC: 10.4 G/DL — LOW (ref 11.5–15.5)
IRON SATN MFR SERPL: 18 % — SIGNIFICANT CHANGE UP (ref 14–50)
IRON SATN MFR SERPL: 48 UG/DL — SIGNIFICANT CHANGE UP (ref 30–160)
LIPID PNL WITH DIRECT LDL SERPL: 104 MG/DL — HIGH
MAGNESIUM SERPL-MCNC: 1.7 MG/DL — SIGNIFICANT CHANGE UP (ref 1.6–2.6)
MCHC RBC-ENTMCNC: 29.5 PG — SIGNIFICANT CHANGE UP (ref 27–34)
MCHC RBC-ENTMCNC: 32.1 GM/DL — SIGNIFICANT CHANGE UP (ref 32–36)
MCV RBC AUTO: 92 FL — SIGNIFICANT CHANGE UP (ref 80–100)
NON HDL CHOLESTEROL: 122 MG/DL — SIGNIFICANT CHANGE UP
NRBC # BLD: 0 /100 WBCS — SIGNIFICANT CHANGE UP (ref 0–0)
PHOSPHATE SERPL-MCNC: 4.4 MG/DL — SIGNIFICANT CHANGE UP (ref 2.5–4.5)
PLATELET # BLD AUTO: 217 K/UL — SIGNIFICANT CHANGE UP (ref 150–400)
POTASSIUM SERPL-MCNC: 5.1 MMOL/L — SIGNIFICANT CHANGE UP (ref 3.5–5.3)
POTASSIUM SERPL-SCNC: 5.1 MMOL/L — SIGNIFICANT CHANGE UP (ref 3.5–5.3)
PROT SERPL-MCNC: 7 G/DL — SIGNIFICANT CHANGE UP (ref 6–8.3)
RBC # BLD: 3.52 M/UL — LOW (ref 3.8–5.2)
RBC # FLD: 13 % — SIGNIFICANT CHANGE UP (ref 10.3–14.5)
RH IG SCN BLD-IMP: POSITIVE — SIGNIFICANT CHANGE UP
RH IG SCN BLD-IMP: POSITIVE — SIGNIFICANT CHANGE UP
SODIUM SERPL-SCNC: 136 MMOL/L — SIGNIFICANT CHANGE UP (ref 135–145)
T4 AB SER-ACNC: 7.39 UG/DL — SIGNIFICANT CHANGE UP (ref 4.5–11.7)
TIBC SERPL-MCNC: 267 UG/DL — SIGNIFICANT CHANGE UP (ref 220–430)
TRIGL SERPL-MCNC: 91 MG/DL — SIGNIFICANT CHANGE UP
TROPONIN T SERPL-MCNC: <0.01 NG/ML — SIGNIFICANT CHANGE UP (ref 0–0.01)
TSH SERPL-MCNC: 1.52 UIU/ML — SIGNIFICANT CHANGE UP (ref 0.27–4.2)
UIBC SERPL-MCNC: 219 UG/DL — SIGNIFICANT CHANGE UP (ref 110–370)
VIT B12 SERPL-MCNC: 431 PG/ML — SIGNIFICANT CHANGE UP (ref 232–1245)
WBC # BLD: 5.19 K/UL — SIGNIFICANT CHANGE UP (ref 3.8–10.5)
WBC # FLD AUTO: 5.19 K/UL — SIGNIFICANT CHANGE UP (ref 3.8–10.5)

## 2022-05-26 PROCEDURE — 93306 TTE W/DOPPLER COMPLETE: CPT | Mod: 26

## 2022-05-26 PROCEDURE — 75574 CT ANGIO HRT W/3D IMAGE: CPT | Mod: 26

## 2022-05-26 PROCEDURE — 33208 INSRT HEART PM ATRIAL & VENT: CPT | Mod: KX

## 2022-05-26 PROCEDURE — 99233 SBSQ HOSP IP/OBS HIGH 50: CPT

## 2022-05-26 PROCEDURE — 93010 ELECTROCARDIOGRAM REPORT: CPT

## 2022-05-26 RX ORDER — CEFAZOLIN SODIUM 1 G
2000 VIAL (EA) INJECTION EVERY 8 HOURS
Refills: 0 | Status: COMPLETED | OUTPATIENT
Start: 2022-05-27 | End: 2022-05-27

## 2022-05-26 RX ORDER — MAGNESIUM SULFATE 500 MG/ML
2 VIAL (ML) INJECTION ONCE
Refills: 0 | Status: COMPLETED | OUTPATIENT
Start: 2022-05-26 | End: 2022-05-26

## 2022-05-26 RX ORDER — CEFAZOLIN SODIUM 1 G
2000 VIAL (EA) INJECTION ONCE
Refills: 0 | Status: COMPLETED | OUTPATIENT
Start: 2022-05-26 | End: 2022-05-26

## 2022-05-26 RX ORDER — AMLODIPINE BESYLATE 2.5 MG/1
5 TABLET ORAL DAILY
Refills: 0 | Status: DISCONTINUED | OUTPATIENT
Start: 2022-05-26 | End: 2022-05-27

## 2022-05-26 RX ADMIN — LOSARTAN POTASSIUM 100 MILLIGRAM(S): 100 TABLET, FILM COATED ORAL at 23:15

## 2022-05-26 RX ADMIN — Medication 100 MILLIGRAM(S): at 17:00

## 2022-05-26 RX ADMIN — Medication 81 MILLIGRAM(S): at 10:56

## 2022-05-26 RX ADMIN — Medication 5 MILLIGRAM(S): at 10:57

## 2022-05-26 RX ADMIN — ATORVASTATIN CALCIUM 10 MILLIGRAM(S): 80 TABLET, FILM COATED ORAL at 23:14

## 2022-05-26 RX ADMIN — MIRTAZAPINE 7.5 MILLIGRAM(S): 45 TABLET, ORALLY DISINTEGRATING ORAL at 23:15

## 2022-05-26 RX ADMIN — INSULIN GLARGINE 15 UNIT(S): 100 INJECTION, SOLUTION SUBCUTANEOUS at 23:12

## 2022-05-26 RX ADMIN — Medication 25 MILLIGRAM(S): at 19:36

## 2022-05-26 RX ADMIN — AMLODIPINE BESYLATE 5 MILLIGRAM(S): 2.5 TABLET ORAL at 06:38

## 2022-05-26 RX ADMIN — Medication 25 GRAM(S): at 07:51

## 2022-05-26 NOTE — DIETITIAN INITIAL EVALUATION ADULT - LITERATURE/VIDEOS GIVEN
Unable to provide education at this time d/t pt out of room. A1c controlled for DM, however, could use brief review of CHO consistent diet and allow pt to ask any questions.

## 2022-05-26 NOTE — PROGRESS NOTE ADULT - PROBLEM SELECTOR PLAN 4
Holding home Janumet 50-1000mg BID and Ozempic on admission  -C/w MARQUIS and (1/2) home Lantus 15U QD  -A1C 6.5% Holding home Janumet 50-1000mg BID and Ozempic on admission  -C/w MARQUIS and (1/2) home Lantus 15U Sub Q Daily  -A1C 6.5%

## 2022-05-26 NOTE — DIETITIAN INITIAL EVALUATION ADULT - PERTINENT LABORATORY DATA
05-26    136  |  103  |  26<H>  ----------------------------<  136<H>  5.1   |  23  |  1.35<H>    Ca    9.2      26 May 2022 06:46  Phos  4.4     05-26  Mg     1.7     05-26    TPro  7.0  /  Alb  3.2<L>  /  TBili  0.2  /  DBili  x   /  AST  22  /  ALT  11  /  AlkPhos  63  05-26  POCT Blood Glucose.: 82 mg/dL (05-26-22 @ 16:26)  A1C with Estimated Average Glucose Result: 6.5 % (05-26-22 @ 06:47)

## 2022-05-26 NOTE — PROGRESS NOTE ADULT - PROBLEM SELECTOR PLAN 7
Pt endorsing loss of appetite over the past year with 20lb weight loss  -PCP aware and prescribed Mirtazapine 7.5mg Daily for appetite stimulation however pt ran out  -Nutrition consult  -C/w Mirtazapine on admission     DVT PPX: SCDs  Dispo: pending PPM placement  Case d/w Dr. Mckeon Pt endorsing loss of appetite over the past year with 20lb weight loss  -PCP aware and prescribed Mirtazapine 7.5mg Daily for appetite stimulation however pt ran out  -Nutrition consult  -C/w Mirtazapine on admission     FULL CODE  DVT PPX: SCDs  Dispo: pending CTA Coronaries, PPM placement, PT consult ordered. Follow-up recs.  Case d/w Dr. Mckeon

## 2022-05-26 NOTE — CHART NOTE - NSCHARTNOTEFT_GEN_A_CORE
MOY NGUYEN  [id number]    PROCEDURE:  implant of left sided dual chamber pacemaker      INDICATION:  AV block      ELECTROPHYSIOLOGIST(S):  Dr. Patel  Fellow Dr. Rollins      ANESTHESIOLOGY:  MAC, local      FINDINGS:  - uncomplicated implant of left sided dual chamber pacemaker via cephalic cut down (Graphenea), fibrillar used to aid with hemostasis      COMPLICATIONS:  none      RECOMMENDATIONS:  - CXR PA/Lat., device interrogation prior to discharge  - post op. Abx as ordered

## 2022-05-26 NOTE — DIETITIAN INITIAL EVALUATION ADULT - PERTINENT MEDS FT
MEDICATIONS  (STANDING):  amLODIPine   Tablet 5 milliGRAM(s) Oral daily  aspirin enteric coated 81 milliGRAM(s) Oral daily  atorvastatin 10 milliGRAM(s) Oral at bedtime  ceFAZolin   IVPB 2000 milliGRAM(s) IV Intermittent once  dextrose 5%. 1000 milliLiter(s) (100 mL/Hr) IV Continuous <Continuous>  dextrose 5%. 1000 milliLiter(s) (50 mL/Hr) IV Continuous <Continuous>  dextrose 50% Injectable 25 Gram(s) IV Push once  dextrose 50% Injectable 12.5 Gram(s) IV Push once  dextrose 50% Injectable 25 Gram(s) IV Push once  glucagon  Injectable 1 milliGRAM(s) IntraMuscular once  hydrochlorothiazide 25 milliGRAM(s) Oral every 24 hours  insulin glargine Injectable (LANTUS) 15 Unit(s) SubCutaneous at bedtime  insulin lispro (ADMELOG) corrective regimen sliding scale   SubCutaneous Before meals and at bedtime  losartan 100 milliGRAM(s) Oral every 24 hours  mirtazapine 7.5 milliGRAM(s) Oral at bedtime  oxybutynin 5 milliGRAM(s) Oral daily    MEDICATIONS  (PRN):  dextrose Oral Gel 15 Gram(s) Oral once PRN Blood Glucose LESS THAN 70 milliGRAM(s)/deciliter

## 2022-05-26 NOTE — PROGRESS NOTE ADULT - SUBJECTIVE AND OBJECTIVE BOX
Interventional Cardiology PA Adult Progress Note    CC: 2nd degree Heart Block Type II  Subjective Assessment: Patient seen and examined at bedside. Patient denies C/P, SOB, palpitations, dizziness, diaphoresis, N/V, syncope, visual changes, weakness of upper or lower extremities.    ROS otherwise negative unless otherwise stated except per HPI and SUbjective   	  MEDICATIONS:  amLODIPine   Tablet 5 milliGRAM(s) Oral daily  hydrochlorothiazide 25 milliGRAM(s) Oral every 24 hours  losartan 100 milliGRAM(s) Oral every 24 hours  mirtazapine 7.5 milliGRAM(s) Oral at bedtime  atorvastatin 10 milliGRAM(s) Oral at bedtime  dextrose 50% Injectable 25 Gram(s) IV Push once  dextrose 50% Injectable 12.5 Gram(s) IV Push once  dextrose 50% Injectable 25 Gram(s) IV Push once  dextrose Oral Gel 15 Gram(s) Oral once PRN  glucagon  Injectable 1 milliGRAM(s) IntraMuscular once  insulin glargine Injectable (LANTUS) 15 Unit(s) SubCutaneous at bedtime  insulin lispro (ADMELOG) corrective regimen sliding scale   SubCutaneous Before meals and at bedtime  aspirin enteric coated 81 milliGRAM(s) Oral daily  dextrose 5%. 1000 milliLiter(s) IV Continuous <Continuous>  dextrose 5%. 1000 milliLiter(s) IV Continuous <Continuous>  oxybutynin 5 milliGRAM(s) Oral daily    [PHYSICAL EXAM:  TELEMETRY:  T(C): 36.6 (05-26-22 @ 08:56), Max: 37.1 (05-25-22 @ 13:43)  HR: 38 (05-26-22 @ 05:13) (35 - 44)  BP: 177/74 (05-26-22 @ 05:13) (170/74 - 229/74)  RR: 18 (05-26-22 @ 05:13) (16 - 18)  SpO2: 100% (05-26-22 @ 05:13) (99% - 100%)  Wt(kg): --  I&O's Summary    25 May 2022 07:01  -  26 May 2022 07:00  --------------------------------------------------------  IN: 300 mL / OUT: 1050 mL / NET: -750 mL    26 May 2022 07:01  -  26 May 2022 09:06  --------------------------------------------------------  IN: 0 mL / OUT: 0 mL / NET: 0 mL      Height (cm): 152.4 (05-26 @ 07:26)  Weight (kg): 59 (05-26 @ 07:26)  BMI (kg/m2): 25.4 (05-26 @ 07:26)  BSA (m2): 1.55 (05-26 @ 07:26)  Brady: No  Central/PICC/Mid Line: No                                        Appearance: Normal. NAD	  HEENT:   Normal oral mucosa, PERRL, EOMI	  Neck: Supple. No JVD.   Cardiovascular: + S1 S2. RRR. 3/6 DWAYNE LUSB.   Respiratory: CTA Bilaterally. No rhonchi, wheezes, rales.   Gastrointestinal: BS x 4. Soft NT/ND  Skin: No rashes, No ecchymoses, No cyanosis  Extremities: Normal range of motion, No clubbing, cyanosis or edema BLE.   Neurologic: Non-focal  Psychiatry: A & O x 3, Mood & affect appropriate  	      LABS:	 	  CARDIAC MARKERS:                  10.4   5.19  )-----------( 217      ( 26 May 2022 06:46 )             32.4     05-26    136  |  103  |  26<H>  ----------------------------<  136<H>  5.1   |  23  |  1.35<H>    Ca    9.2      26 May 2022 06:46  Mg     1.7     05-26    TPro  7.0  /  Alb  3.2<L>  /  TBili  0.2  /  DBili  x   /  AST  22  /  ALT  11  /  AlkPhos  63  05-26    proBNP: Serum Pro-Brain Natriuretic Peptide: 780 pg/mL (05-25 @ 13:03)    Lipid Profile:   HgA1c:   TSH: Thyroid Stimulating Hormone, Serum: 1.520 uIU/mL (05-26 @ 06:46)    PT/INR - ( 25 May 2022 13:03 )   PT: 13.3 sec;   INR: 1.12     PTT - ( 25 May 2022 13:03 )  PTT:27.3 sec

## 2022-05-26 NOTE — PROGRESS NOTE ADULT - PROBLEM SELECTOR PLAN 2
Pt with elevated BPs to 229/74 initially in ER - did not take any meds today AM  -S/p Amlodipine 10mg x1 in ER   -C/w home Losartan 100mg, HCTZ 25mg Daily  -Amlodipine 5 mg daily initiated 5/25/22  -Would not aggressively treat HTN at this time as it is likely compensatory mechanism given bradycardia Pt with elevated BPs to 229/74 initially in ER - did not take any meds today AM  -S/p Amlodipine 10mg x 1 in ER   -C/w home Losartan 100mg, HCTZ 25mg Daily  -Amlodipine 5 mg daily initiated 5/25/22  -Would not aggressively treat HTN at this time as it is likely compensatory mechanism given bradycardia

## 2022-05-26 NOTE — PROGRESS NOTE ADULT - PROBLEM SELECTOR PLAN 6
Pt endorsing loss of appetite over the past year with 20lb weight loss  -PCP aware and prescribed Mirtazapine 7.5mg QD for appetite stimulation however pt ran out  -Nutrition consult  -C/w Mirtazapine on admission     DVT PPX: SCDs  Dispo: pending PPM placement  Case d/w Dr. Mckeon CKD Stage III-GFR 40 Cr on admission 1.2 now 1.35.  -Continue to monitor electrolytes, renal function, and volume status  -Avoid Nephrotoxic Agents   -Daily Phosphorous CKD Stage III-GFR 40 Cr on admission 1.2 now 1.35.  -Continue to monitor electrolytes, renal function, and volume status  -Avoid Nephrotoxic Agents   -Daily Phosphorous-4.4

## 2022-05-26 NOTE — PROGRESS NOTE ADULT - ASSESSMENT
76 yo F with a PMH of HTN, HLD, DM Type II who presented to [facility] ED after worsening RAMIREZ/fatigue with Holter monitor reveling second degree type II heart block with HRs upper 30s-40s. Pt states she is currently and has always been ASX, however EP consulted with plan for admission to cardiac tele and PPM in AM.

## 2022-05-26 NOTE — DIETITIAN INITIAL EVALUATION ADULT - OTHER INFO
78 yo F with a PMH of HTN, HLD, DM who presented to [facility] ED after worsening RAMIREZ/fatigue with Holter monitor reveling second degree type II heart block with HRs upper 30s-40s. Pt states she is currently and has always been ASX. Planned for PPM on 5/27, however, holding off until CAD rule out.   Noted A1c on 5/26 is 6.5%, DM well controlled. Currently on DASH/TLC/CHO consistent diet, unsure po intake d/t pt out of room for pacemaker placement today.

## 2022-05-26 NOTE — PROGRESS NOTE ADULT - PROBLEM SELECTOR PLAN 3
C/w home Atorvastatin 10mg Daily  (Zetia 10mg Daily non-formulary)  -, HDL 51, TG 91, Total CHolesterol 173.

## 2022-05-26 NOTE — PROGRESS NOTE ADULT - PROBLEM SELECTOR PLAN 1
Per MD, endorsing worsening RAMIREZ/fatigue over past few weeks - pt states shes ASX  -Holter Monitor 4/2021 (Dr. Corral) revealing Wenckebach   -Holter Monitor 5/2022 -- second degree heart block type II w/ HRs upper 30s-40s  -ECG: SB @ 41bpm with 2nd degree type II heart block (), no ST changes or TWI  -Troponin negative x1  -TSH and Free T4 normal.   -Echocardiogram ordered, f/u results  -EP consulted: NPO after midnight for likely dual chamber in AM  -Pacer pads, atropine at bedside, strict tele monitoring Per MD, endorsing worsening RAMIREZ/fatigue over past few weeks - pt states shes ASX  -Holter Monitor 4/2021 (Dr. Corral) revealing Wenckebach   -Holter Monitor 5/2022 -- second degree heart block type II w/ HRs upper 30s-40s  -ECG: SB @ 41bpm with 2nd degree type II heart block (), no ST changes or TWI  -Troponin negative x1  -TSH and Free T4 normal.   -Echocardiogram 5/26/22 revealed normal LV function, dilated left atrium, normal RV size and function, trace AR, mild to moderate AS, PASP 36 mm Hg, no pericardial effusion, mild systolic MR and mild to moderate diastolic MR,   -Plan for CTA Coronaries to R/O CAD specifically RCA disease given 2nd degree HB and Bradycardia.   -EP consulted: NPO after midnight for likely dual chamber in AM  -Pacer pads, atropine at bedside, strict tele monitoring Per MD, endorsing worsening RAMIREZ/fatigue over past few weeks - pt states shes ASX  -Holter Monitor 4/2021 (Dr. Corral) revealing Wenckebach   -Holter Monitor 5/2022 -- second degree heart block type II w/ HRs upper 30s-40s  -ECG: SB @ 41bpm with 2nd degree type II heart block (), no ST changes or TWI  -Troponin negative x1  -TSH and Free T4 normal.   -Echocardiogram 5/26/22 revealed normal LV function, dilated left atrium, normal RV size and function, trace AR, mild to moderate AS, PASP 36 mm Hg, no pericardial effusion, mild systolic MR and mild to moderate diastolic MR,   -Plan for CTA Coronaries to R/O CAD specifically RCA disease given 2nd degree HB and Bradycardia.   -EP consulted: NPO after midnight for PPM however now holding off on PPM until significant CAD ruled out.  -Pacer pads, atropine at bedside, strict tele monitoring

## 2022-05-26 NOTE — PROGRESS NOTE ADULT - PROBLEM SELECTOR PLAN 5
Hgb/HCT  notable for 10.7/33.4 -- denies melena, hematuria, BRBPR  -Iron studies normal. Follow-up Vitamin B12 and Folate   -Active T&S

## 2022-05-27 ENCOUNTER — TRANSCRIPTION ENCOUNTER (OUTPATIENT)
Age: 77
End: 2022-05-27

## 2022-05-27 VITALS
HEART RATE: 78 BPM | RESPIRATION RATE: 18 BRPM | SYSTOLIC BLOOD PRESSURE: 158 MMHG | OXYGEN SATURATION: 100 % | DIASTOLIC BLOOD PRESSURE: 70 MMHG

## 2022-05-27 LAB
ANION GAP SERPL CALC-SCNC: 12 MMOL/L — SIGNIFICANT CHANGE UP (ref 5–17)
BASOPHILS # BLD AUTO: 0 K/UL — SIGNIFICANT CHANGE UP (ref 0–0.2)
BASOPHILS NFR BLD AUTO: 0 % — SIGNIFICANT CHANGE UP (ref 0–2)
BUN SERPL-MCNC: 28 MG/DL — HIGH (ref 7–23)
CALCIUM SERPL-MCNC: 9.6 MG/DL — SIGNIFICANT CHANGE UP (ref 8.4–10.5)
CHLORIDE SERPL-SCNC: 100 MMOL/L — SIGNIFICANT CHANGE UP (ref 96–108)
CO2 SERPL-SCNC: 24 MMOL/L — SIGNIFICANT CHANGE UP (ref 22–31)
CREAT SERPL-MCNC: 1.38 MG/DL — HIGH (ref 0.5–1.3)
EGFR: 39 ML/MIN/1.73M2 — LOW
EOSINOPHIL # BLD AUTO: 0 K/UL — SIGNIFICANT CHANGE UP (ref 0–0.5)
EOSINOPHIL NFR BLD AUTO: 0 % — SIGNIFICANT CHANGE UP (ref 0–6)
GLUCOSE BLDC GLUCOMTR-MCNC: 170 MG/DL — HIGH (ref 70–99)
GLUCOSE BLDC GLUCOMTR-MCNC: 222 MG/DL — HIGH (ref 70–99)
GLUCOSE SERPL-MCNC: 166 MG/DL — HIGH (ref 70–99)
HCT VFR BLD CALC: 35.2 % — SIGNIFICANT CHANGE UP (ref 34.5–45)
HGB BLD-MCNC: 11.5 G/DL — SIGNIFICANT CHANGE UP (ref 11.5–15.5)
IMM GRANULOCYTES NFR BLD AUTO: 0.3 % — SIGNIFICANT CHANGE UP (ref 0–1.5)
LYMPHOCYTES # BLD AUTO: 0.89 K/UL — LOW (ref 1–3.3)
LYMPHOCYTES # BLD AUTO: 13.9 % — SIGNIFICANT CHANGE UP (ref 13–44)
MAGNESIUM SERPL-MCNC: 2.1 MG/DL — SIGNIFICANT CHANGE UP (ref 1.6–2.6)
MCHC RBC-ENTMCNC: 29.7 PG — SIGNIFICANT CHANGE UP (ref 27–34)
MCHC RBC-ENTMCNC: 32.7 GM/DL — SIGNIFICANT CHANGE UP (ref 32–36)
MCV RBC AUTO: 91 FL — SIGNIFICANT CHANGE UP (ref 80–100)
MONOCYTES # BLD AUTO: 0.37 K/UL — SIGNIFICANT CHANGE UP (ref 0–0.9)
MONOCYTES NFR BLD AUTO: 5.8 % — SIGNIFICANT CHANGE UP (ref 2–14)
NEUTROPHILS # BLD AUTO: 5.11 K/UL — SIGNIFICANT CHANGE UP (ref 1.8–7.4)
NEUTROPHILS NFR BLD AUTO: 80 % — HIGH (ref 43–77)
NRBC # BLD: 0 /100 WBCS — SIGNIFICANT CHANGE UP (ref 0–0)
PHOSPHATE SERPL-MCNC: 4.2 MG/DL — SIGNIFICANT CHANGE UP (ref 2.5–4.5)
PLATELET # BLD AUTO: 241 K/UL — SIGNIFICANT CHANGE UP (ref 150–400)
POTASSIUM SERPL-MCNC: 5 MMOL/L — SIGNIFICANT CHANGE UP (ref 3.5–5.3)
POTASSIUM SERPL-SCNC: 5 MMOL/L — SIGNIFICANT CHANGE UP (ref 3.5–5.3)
RBC # BLD: 3.87 M/UL — SIGNIFICANT CHANGE UP (ref 3.8–5.2)
RBC # FLD: 12.9 % — SIGNIFICANT CHANGE UP (ref 10.3–14.5)
SODIUM SERPL-SCNC: 136 MMOL/L — SIGNIFICANT CHANGE UP (ref 135–145)
WBC # BLD: 6.39 K/UL — SIGNIFICANT CHANGE UP (ref 3.8–10.5)
WBC # FLD AUTO: 6.39 K/UL — SIGNIFICANT CHANGE UP (ref 3.8–10.5)

## 2022-05-27 PROCEDURE — 84443 ASSAY THYROID STIM HORMONE: CPT

## 2022-05-27 PROCEDURE — 86850 RBC ANTIBODY SCREEN: CPT

## 2022-05-27 PROCEDURE — 36415 COLL VENOUS BLD VENIPUNCTURE: CPT

## 2022-05-27 PROCEDURE — 84484 ASSAY OF TROPONIN QUANT: CPT

## 2022-05-27 PROCEDURE — 86900 BLOOD TYPING SEROLOGIC ABO: CPT

## 2022-05-27 PROCEDURE — 75574 CT ANGIO HRT W/3D IMAGE: CPT

## 2022-05-27 PROCEDURE — C1889: CPT

## 2022-05-27 PROCEDURE — 83540 ASSAY OF IRON: CPT

## 2022-05-27 PROCEDURE — 80048 BASIC METABOLIC PNL TOTAL CA: CPT

## 2022-05-27 PROCEDURE — 99232 SBSQ HOSP IP/OBS MODERATE 35: CPT

## 2022-05-27 PROCEDURE — 87635 SARS-COV-2 COVID-19 AMP PRB: CPT

## 2022-05-27 PROCEDURE — 84436 ASSAY OF TOTAL THYROXINE: CPT

## 2022-05-27 PROCEDURE — 71045 X-RAY EXAM CHEST 1 VIEW: CPT

## 2022-05-27 PROCEDURE — 82607 VITAMIN B-12: CPT

## 2022-05-27 PROCEDURE — 71046 X-RAY EXAM CHEST 2 VIEWS: CPT | Mod: 26

## 2022-05-27 PROCEDURE — 86803 HEPATITIS C AB TEST: CPT

## 2022-05-27 PROCEDURE — 97161 PT EVAL LOW COMPLEX 20 MIN: CPT

## 2022-05-27 PROCEDURE — 80053 COMPREHEN METABOLIC PANEL: CPT

## 2022-05-27 PROCEDURE — 83735 ASSAY OF MAGNESIUM: CPT

## 2022-05-27 PROCEDURE — 83880 ASSAY OF NATRIURETIC PEPTIDE: CPT

## 2022-05-27 PROCEDURE — 83550 IRON BINDING TEST: CPT

## 2022-05-27 PROCEDURE — C1769: CPT

## 2022-05-27 PROCEDURE — 85027 COMPLETE CBC AUTOMATED: CPT

## 2022-05-27 PROCEDURE — 85610 PROTHROMBIN TIME: CPT

## 2022-05-27 PROCEDURE — 93306 TTE W/DOPPLER COMPLETE: CPT

## 2022-05-27 PROCEDURE — 82553 CREATINE MB FRACTION: CPT

## 2022-05-27 PROCEDURE — 84100 ASSAY OF PHOSPHORUS: CPT

## 2022-05-27 PROCEDURE — C1898: CPT

## 2022-05-27 PROCEDURE — 82728 ASSAY OF FERRITIN: CPT

## 2022-05-27 PROCEDURE — 82550 ASSAY OF CK (CPK): CPT

## 2022-05-27 PROCEDURE — 82746 ASSAY OF FOLIC ACID SERUM: CPT

## 2022-05-27 PROCEDURE — 99239 HOSP IP/OBS DSCHRG MGMT >30: CPT

## 2022-05-27 PROCEDURE — 83036 HEMOGLOBIN GLYCOSYLATED A1C: CPT

## 2022-05-27 PROCEDURE — 71046 X-RAY EXAM CHEST 2 VIEWS: CPT

## 2022-05-27 PROCEDURE — 93005 ELECTROCARDIOGRAM TRACING: CPT

## 2022-05-27 PROCEDURE — 86901 BLOOD TYPING SEROLOGIC RH(D): CPT

## 2022-05-27 PROCEDURE — 80061 LIPID PANEL: CPT

## 2022-05-27 PROCEDURE — 82962 GLUCOSE BLOOD TEST: CPT

## 2022-05-27 PROCEDURE — 99285 EMERGENCY DEPT VISIT HI MDM: CPT

## 2022-05-27 PROCEDURE — 85025 COMPLETE CBC W/AUTO DIFF WBC: CPT

## 2022-05-27 PROCEDURE — 85730 THROMBOPLASTIN TIME PARTIAL: CPT

## 2022-05-27 PROCEDURE — C1785: CPT

## 2022-05-27 RX ORDER — ATORVASTATIN CALCIUM 80 MG/1
1 TABLET, FILM COATED ORAL
Qty: 30 | Refills: 0
Start: 2022-05-27 | End: 2022-06-25

## 2022-05-27 RX ORDER — ATORVASTATIN CALCIUM 80 MG/1
1 TABLET, FILM COATED ORAL
Qty: 0 | Refills: 0 | DISCHARGE

## 2022-05-27 RX ORDER — ATORVASTATIN CALCIUM 80 MG/1
1 TABLET, FILM COATED ORAL
Qty: 30 | Refills: 2
Start: 2022-05-27 | End: 2022-08-24

## 2022-05-27 RX ORDER — AMLODIPINE BESYLATE 2.5 MG/1
1 TABLET ORAL
Qty: 30 | Refills: 0
Start: 2022-05-27 | End: 2022-06-25

## 2022-05-27 RX ORDER — AMLODIPINE BESYLATE 2.5 MG/1
1 TABLET ORAL
Qty: 30 | Refills: 2
Start: 2022-05-27 | End: 2022-08-24

## 2022-05-27 RX ADMIN — Medication 1: at 07:16

## 2022-05-27 RX ADMIN — Medication 5 MILLIGRAM(S): at 11:12

## 2022-05-27 RX ADMIN — Medication 100 MILLIGRAM(S): at 01:06

## 2022-05-27 RX ADMIN — AMLODIPINE BESYLATE 5 MILLIGRAM(S): 2.5 TABLET ORAL at 06:19

## 2022-05-27 RX ADMIN — Medication 100 MILLIGRAM(S): at 09:47

## 2022-05-27 RX ADMIN — Medication 2: at 11:42

## 2022-05-27 RX ADMIN — Medication 81 MILLIGRAM(S): at 09:46

## 2022-05-27 NOTE — PHYSICAL THERAPY INITIAL EVALUATION ADULT - PERTINENT HX OF CURRENT PROBLEM, REHAB EVAL
Patient is a 77 year old female presented to [facility] ED after worsening RAMIREZ/fatigue with Holter monitor reveling second degree type II heart block with HRs upper 30s-40s. Pt states she is currently and has always been ASX, however EP consulted with plan for admission to cardiac tele and PPM in AM.

## 2022-05-27 NOTE — DISCHARGE NOTE PROVIDER - HOSPITAL COURSE
not completed    76 yo F with a PMH of HTN, HLD, DM, CAD s/p prior PCI LAD  who presented to [facility] ED after worsening RAMIREZ/fatigue with outpatient Holter monitor reveling second degree type II heart block with HRs upper 30s-40s, Asx on arrival.  Patient admitted to Cardiac Tele for further management.      EP consulted on admission.  ECG: SB @ 41bpm with 2nd degree type II heart block (), no ST changes or TWI.  Trop T negative.  Thyroid function wnl.  S/p Echocardiogram 5/26/22 revealing normal LV function, dilated left atrium, normal RV size and function, trace AR, mild to moderate AS, PASP 36 mm Hg, no pericardial effusion, mild systolic MR and mild to moderate diastolic MR.      CTA Cors performed to evaluate for RCA disease given arrythmia revealing calcium score is severe at 954 Agatston units, Study is limited by excessive motion and dense calcification, Less than 50% stenosis of the Left Main, Cannot exclude proximal LAD or D1 stenosis, Stent appears to be present in the mid LAD, LCX has mild proximal stenosis. Cannot exclude stenosis in its mid-segment and OM1 branch, RCA has diffuse moderate stenoses in its proximal to distal segments.  Plan for patient to follow up with cardiology as outpatient for a stress test given poor study and moderate stenosis in the RCA.     Patient is now s/p Dual chamber PPM placement on 5/26/22 ([place]).      During hospital course patient noted to be hypertensive possible compensatory in setting of bradycardia.  She was initiated on norvasc 5mg QD.  Continued on home Losartan 100mg, HCTZ 25mg Daily       76 yo F with a PMH of HTN, HLD, DM, CAD s/p prior PCI LAD  who presented to [facility] ED after worsening RAMIREZ/fatigue with outpatient Holter monitor reveling second degree type II heart block with HRs upper 30s-40s, Asx on arrival.  Patient admitted to Cardiac Tele for further management.      EP consulted on admission.  ECG: SB @ 41bpm with 2nd degree type II heart block (), no ST changes or TWI.  Trop T negative.  Thyroid function wnl.  S/p Echocardiogram 5/26/22 revealing normal LV function, dilated left atrium, normal RV size and function, trace AR, mild to moderate AS, PASP 36 mm Hg, no pericardial effusion, mild systolic MR and mild to moderate diastolic MR.  CTA Cors performed to evaluate for RCA disease given arrythmia revealing calcium score is severe at 954 Agatston units, Study is limited by excessive motion and dense calcification, Less than 50% stenosis of the Left Main, Cannot exclude proximal LAD or D1 stenosis, Stent appears to be present in the mid LAD, cannot exclude ISR., LCX has mild proximal stenosis. Cannot exclude stenosis in its mid-segment and OM1 branch, RCA has diffuse moderate stenoses in its proximal to distal segments.  Plan for patient to follow up with cardiology as outpatient for a stress test given poor study and moderate stenosis in the RCA.  Patient is now s/p Dual chamber PPM placement on 5/26/22 ([facility]).  PPM interrogated and working well.  Post PPM CXR performed w/ PPM in position and no documented pneumothorax.  During hospital course patient noted to be hypertensive possible compensatory in setting of bradycardia.  She was initiated on norvasc 5mg QD.  Continued on home Losartan 100mg, HCTZ 25mg Daily w/ stable blood pressure.  Given CAD seen on CTA Cors patient's home Lipitor increased to 40mg QHS.      Patient now feeling well without events or complaints.  Labs, Vitals, meds reviewed with Dr. Mckeon and patient deemed stable for discharge home.  New prescriptions are E prescribed to pharmacy of choice.  Patient will follow up with Dr. Lawrence on 6/6/22 in [facility] and then be referred to another EP MD near where patient lives.  Patient will also follow up with her PCP Dr. Ivey in 1-2 weeks and obtain a referral for a cardiologist within the office (per patient she already has a stress test scheduled within the next couple weeks).  Discharge instructions reviewed with the patient.

## 2022-05-27 NOTE — DISCHARGE NOTE PROVIDER - CARE PROVIDER_API CALL
Fermin Corral)  Cardiac Electrophysiology  [address], [address] [place] [address]  Phone: [phone]  Fax: [phone]  Scheduled Appointment: 06/06/2022    TERE malloy  Phone: (   )    -  Fax: (   )    -  Follow Up Time:

## 2022-05-27 NOTE — DISCHARGE NOTE PROVIDER - CARE PROVIDERS DIRECT ADDRESSES
[email or web address]

## 2022-05-27 NOTE — DISCHARGE NOTE NURSING/CASE MANAGEMENT/SOCIAL WORK - NSTRANSFERBELONGINGSDISPO_GEN_A_NUR
Joel Mayberry infant exam in 2 months.   with patient

## 2022-05-27 NOTE — DISCHARGE NOTE PROVIDER - NSDCMRMEDTOKEN_GEN_ALL_CORE_FT
Aspirin Enteric Coated 81 mg oral delayed release tablet: 1 tab(s) orally once a day  Basaglar KwikPen 100 units/mL subcutaneous solution: 30 unit(s) subcutaneous once a day (at bedtime)  Janumet 50 mg-1000 mg oral tablet: 1 tab(s) orally 2 times a day  Lipitor 10 mg oral tablet: 1 tab(s) orally once a day  losartan-hydrochlorothiazide 100 mg-25 mg oral tablet: 1 tab(s) orally once a day  mirtazapine 7.5 mg oral tablet: 1 tab(s) orally once a day (at bedtime)  oxybutynin 5 mg oral tablet: 1 tab(s) orally once a day  Ozempic (1 mg dose) 4 mg/3 mL subcutaneous solution: 1 dose(s) subcutaneous once a week  Zetia 10 mg oral tablet: 1 tab(s) orally once a day   amLODIPine 5 mg oral tablet: 1 tab(s) orally once a day  Aspirin Enteric Coated 81 mg oral delayed release tablet: 1 tab(s) orally once a day  atorvastatin 40 mg oral tablet: 1 tab(s) orally once a day  Basaglar KwikPen 100 units/mL subcutaneous solution: 30 unit(s) subcutaneous once a day (at bedtime)  Janumet 50 mg-1000 mg oral tablet: 1 tab(s) orally 2 times a day  losartan-hydrochlorothiazide 100 mg-25 mg oral tablet: 1 tab(s) orally once a day  mirtazapine 7.5 mg oral tablet: 1 tab(s) orally once a day (at bedtime)  oxybutynin 5 mg oral tablet: 1 tab(s) orally once a day  Ozempic (1 mg dose) 4 mg/3 mL subcutaneous solution: 1 dose(s) subcutaneous once a week  Zetia 10 mg oral tablet: 1 tab(s) orally once a day

## 2022-05-27 NOTE — PROGRESS NOTE ADULT - SUBJECTIVE AND OBJECTIVE BOX
Progress Note and Electrophysiology Device Interrogation     Ms. Rose is a 77 yo F w/HTN, DM, and HLD, p/w symptomatic 2:1 AVB w/ HRs 30- 40 bpm and hypertensive to 220/90s in the ED. On 5/26 had CCTA no interventions necessary. She is now s/p dual chamber PPM (BSci) on 5/26 by Dr. Simran Patel.     O: T(C): 36.2 (05-27-22 @ 10:23), Max: 36.6 (05-26-22 @ 16:26)  HR: 72 (05-27-22 @ 08:20) (36 - 82)  BP: 161/73 (05-27-22 @ 08:20) (125/61 - 204/88)  RR: 18 (05-27-22 @ 08:20) (17 - 18)  SpO2: 100% (05-27-22 @ 08:20) (97% - 100%)    TELE: v-paced 60    PHYSICAL  General:  NAD        Chest:  CTA B/L, no w/r/r  Cardiac:  RRR, + s1/s2 , no m/g/r  Abdomen:   soft ND/NT  Extremities: No edema, b/l groin no hematoma/bleeding/oozing  Skin: no rash noted, normal color and pigmentation  Psych: A&Ox3, normal affect and mood  Neuro: no deficit noted     LABS:                        11.5   6.39  )-----------( 241      ( 27 May 2022 06:58 )             35.2     136  |  100  |  28<H>  ----------------------------<  166<H>  5.0   |  24  |  1.38<H>    Ca    9.6      27 May 2022 06:58  Phos  4.2     05-27  Mg     2.1     05-27    TPro  7.0  /  Alb  3.2<L>  /  TBili  0.2  /  DBili  x   /  AST  22  /  ALT  11  /  AlkPhos  63  05-26    PT/INR - ( 25 May 2022 13:03 )   PT: 13.3 sec;   INR: 1.12     PTT - ( 25 May 2022 13:03 )  PTT:27.3 sec    MEDICATIONS:  amLODIPine   Tablet 5 milliGRAM(s) Oral daily  aspirin enteric coated 81 milliGRAM(s) Oral daily  atorvastatin 10 milliGRAM(s) Oral at bedtime  hydrochlorothiazide 25 milliGRAM(s) Oral every 24 hours  insulin glargine Injectable (LANTUS) 15 Unit(s) SubCutaneous at bedtime  insulin lispro (ADMELOG) corrective regimen sliding scale   SubCutaneous Before meals and at bedtime  losartan 100 milliGRAM(s) Oral every 24 hours  mirtazapine 7.5 milliGRAM(s) Oral at bedtime  oxybutynin 5 milliGRAM(s) Oral daily    CT Angio Cardiac w/ IV Cont (05.26.22 @ 15:24)  IMPRESSION:  Cardiac:  1.  The calcium score is severe at 954 Agatston units, which is at the 92 percentile, adjusted for age, gender and race (assuming there is no LAD stent).  2.  Study is limited by excessive motion anddense calcification.  3.  Less than 50% stenosis of the Left Main.  4.  Cannot exclude proximal LAD or D1 stenosis.  5.  Stent appears to be present in the mid LAD. Cannot evaluate for in-stent stenosis due to motion.  6.  LCX has mild proximal stenosis. Cannot exclude stenosis in its mid-segment and OM1 branch.  7.  RCA has diffuse moderate stenoses in its proximal to distal segments.  8.  Remaining segments are normal.  9.  Normal left ventricular function.    Non-cardiac:  1. Cardiomegaly.  2. Small bibasilar linear parenchymal bands of subsegmental atelectasis and/or parenchymal scarring most pronounced within the left lower lobe.    INTERROGATION  ____________________________________________________________________________________________  Indication: symptomatic 2:1 AV block    Device model: CromoUp				                            Functioning Mode: DDD low 60, 	    Underlying Rhythm:  2:1 HB    Pacemaker dependency: a-pace <1%; v-pacing 96%    Battery status: ABIGAIL    Interrogating parameters:   				RA		   RV	  Sense:                                       6.7                     >25  Threshold:                                 0.5                       0.4                                                            Pacing Impedance:                     567                     718                                                               Shock Impedance:                       not applicable                                                                                              Events/Alert:  None    Parameter change: 	 NONE    Assessment: normally functioning dual chamber PPM  _______________________________________________________________________________________________    ASSESSMENT & PLAN  Ms. Rose is a 77 yo F w/HTN, DM, and HLD, p/w symptomatic 2:1 AVB w/ HRs 30- 40 bpm and hypertensive to 220/90s in the ED. On 5/26 had CCTA no interventions necessary. She is now s/p dual chamber PPM (BSci) on 5/26 by Dr. Simran Patel.     - device check this AM shows normally functioning device  - pending AM CXR  - She will f/u with Dr. Corral' [place] office on 6/6/2022 once, then will refer to Dr. Galvan's office for EP referral closer to home in [place], as she states it is financially difficult for her to come into the [facility] clinic and drive outside of [place].                                                                Progress Note and Electrophysiology Device Interrogation     Ms. Rose is a 75 yo F w/HTN, DM, and HLD, p/w symptomatic 2:1 AVB w/ HRs 30- 40 bpm and hypertensive to 220/90s in the ED. On 5/26 had CCTA no interventions necessary. She is now s/p dual chamber PPM (BSci) on 5/26 by Dr. Simran Patel.     O: T(C): 36.2 (05-27-22 @ 10:23), Max: 36.6 (05-26-22 @ 16:26)  HR: 72 (05-27-22 @ 08:20) (36 - 82)  BP: 161/73 (05-27-22 @ 08:20) (125/61 - 204/88)  RR: 18 (05-27-22 @ 08:20) (17 - 18)  SpO2: 100% (05-27-22 @ 08:20) (97% - 100%)    TELE: v-paced 60    PHYSICAL  General:  NAD        Chest:  CTA B/L, no w/r/r  Cardiac:  RRR, + s1/s2 , no m/g/r  Abdomen:   soft ND/NT  Extremities: No edema, b/l groin no hematoma/bleeding/oozing  Skin: no rash noted, normal color and pigmentation  Psych: A&Ox3, normal affect and mood  Neuro: no deficit noted     LABS:                        11.5   6.39  )-----------( 241      ( 27 May 2022 06:58 )             35.2     136  |  100  |  28<H>  ----------------------------<  166<H>  5.0   |  24  |  1.38<H>    Ca    9.6      27 May 2022 06:58  Phos  4.2     05-27  Mg     2.1     05-27    TPro  7.0  /  Alb  3.2<L>  /  TBili  0.2  /  DBili  x   /  AST  22  /  ALT  11  /  AlkPhos  63  05-26    PT/INR - ( 25 May 2022 13:03 )   PT: 13.3 sec;   INR: 1.12     PTT - ( 25 May 2022 13:03 )  PTT:27.3 sec    MEDICATIONS:  amLODIPine   Tablet 5 milliGRAM(s) Oral daily  aspirin enteric coated 81 milliGRAM(s) Oral daily  atorvastatin 10 milliGRAM(s) Oral at bedtime  hydrochlorothiazide 25 milliGRAM(s) Oral every 24 hours  insulin glargine Injectable (LANTUS) 15 Unit(s) SubCutaneous at bedtime  insulin lispro (ADMELOG) corrective regimen sliding scale   SubCutaneous Before meals and at bedtime  losartan 100 milliGRAM(s) Oral every 24 hours  mirtazapine 7.5 milliGRAM(s) Oral at bedtime  oxybutynin 5 milliGRAM(s) Oral daily    CT Angio Cardiac w/ IV Cont (05.26.22 @ 15:24)  IMPRESSION:  Cardiac:  1.  The calcium score is severe at 954 Agatston units, which is at the 92 percentile, adjusted for age, gender and race (assuming there is no LAD stent).  2.  Study is limited by excessive motion anddense calcification.  3.  Less than 50% stenosis of the Left Main.  4.  Cannot exclude proximal LAD or D1 stenosis.  5.  Stent appears to be present in the mid LAD. Cannot evaluate for in-stent stenosis due to motion.  6.  LCX has mild proximal stenosis. Cannot exclude stenosis in its mid-segment and OM1 branch.  7.  RCA has diffuse moderate stenoses in its proximal to distal segments.  8.  Remaining segments are normal.  9.  Normal left ventricular function.    Non-cardiac:  1. Cardiomegaly.  2. Small bibasilar linear parenchymal bands of subsegmental atelectasis and/or parenchymal scarring most pronounced within the left lower lobe.    INTERROGATION  ____________________________________________________________________________________________  Indication: symptomatic 2:1 AV block    Device model: State				                            Functioning Mode: DDD low 60, 	    Underlying Rhythm:  2:1 HB    Pacemaker dependency: a-pace <1%; v-pacing 96%    Battery status: ABIGAIL    Interrogating parameters:   				RA		   RV	  Sense:                                       6.7                     >25  Threshold:                                 0.5                       0.4                                                            Pacing Impedance:                     567                     718                                                               Shock Impedance:                       not applicable                                                                                              Events/Alert:  None    Parameter change: 	 NONE    Assessment: normally functioning dual chamber PPM  _______________________________________________________________________________________________    ASSESSMENT & PLAN  Ms. Rose is a 75 yo F w/HTN, DM, and HLD, p/w symptomatic 2:1 AVB w/ HRs 30- 40 bpm and hypertensive to 220/90s in the ED. On 5/26 had CCTA no interventions necessary. She is now s/p dual chamber PPM (BSci) on 5/26 by Dr. Simran Patel.     - device check this AM shows normally functioning device  - AM CXR shows appropriate wire placement which is unchanged, no complications  - She will f/u with Dr. Corral' [place] office on 6/6/2022 once, then will refer to Dr. Galvan's office for EP referral closer to home in [place], as she states it is financially difficult for her to come into the [place] [facility] clinic and drive outside of [place].                                                                Progress Note and Electrophysiology Device Interrogation     Ms. Rose is a 77 yo F w/HTN, DM, and HLD, p/w symptomatic 2:1 AVB w/ HRs 30- 40 bpm and hypertensive to 220/90s in the ED. On 5/26 had CCTA no interventions necessary. She is now s/p dual chamber PPM (BSci) on 5/26 by Dr. Smiran Patel.     O: T(C): 36.2 (05-27-22 @ 10:23), Max: 36.6 (05-26-22 @ 16:26)  HR: 72 (05-27-22 @ 08:20) (36 - 82)  BP: 161/73 (05-27-22 @ 08:20) (125/61 - 204/88)  RR: 18 (05-27-22 @ 08:20) (17 - 18)  SpO2: 100% (05-27-22 @ 08:20) (97% - 100%)    TELE: v-paced 60    PHYSICAL  General:  NAD        Chest:  CTA B/L, no w/r/r  Cardiac:  RRR, + s1/s2 , no m/g/r  Abdomen:   soft ND/NT  Extremities: No edema, b/l groin no hematoma/bleeding/oozing  Skin: no rash noted, normal color and pigmentation  Psych: A&Ox3, normal affect and mood  Neuro: no deficit noted     LABS:                        11.5   6.39  )-----------( 241      ( 27 May 2022 06:58 )             35.2     136  |  100  |  28<H>  ----------------------------<  166<H>  5.0   |  24  |  1.38<H>    Ca    9.6      27 May 2022 06:58  Phos  4.2     05-27  Mg     2.1     05-27    TPro  7.0  /  Alb  3.2<L>  /  TBili  0.2  /  DBili  x   /  AST  22  /  ALT  11  /  AlkPhos  63  05-26    PT/INR - ( 25 May 2022 13:03 )   PT: 13.3 sec;   INR: 1.12     PTT - ( 25 May 2022 13:03 )  PTT:27.3 sec    MEDICATIONS:  amLODIPine   Tablet 5 milliGRAM(s) Oral daily  aspirin enteric coated 81 milliGRAM(s) Oral daily  atorvastatin 10 milliGRAM(s) Oral at bedtime  hydrochlorothiazide 25 milliGRAM(s) Oral every 24 hours  insulin glargine Injectable (LANTUS) 15 Unit(s) SubCutaneous at bedtime  insulin lispro (ADMELOG) corrective regimen sliding scale   SubCutaneous Before meals and at bedtime  losartan 100 milliGRAM(s) Oral every 24 hours  mirtazapine 7.5 milliGRAM(s) Oral at bedtime  oxybutynin 5 milliGRAM(s) Oral daily    CT Angio Cardiac w/ IV Cont (05.26.22 @ 15:24)  IMPRESSION:  Cardiac:  1.  The calcium score is severe at 954 Agatston units, which is at the 92 percentile, adjusted for age, gender and race (assuming there is no LAD stent).  2.  Study is limited by excessive motion anddense calcification.  3.  Less than 50% stenosis of the Left Main.  4.  Cannot exclude proximal LAD or D1 stenosis.  5.  Stent appears to be present in the mid LAD. Cannot evaluate for in-stent stenosis due to motion.  6.  LCX has mild proximal stenosis. Cannot exclude stenosis in its mid-segment and OM1 branch.  7.  RCA has diffuse moderate stenoses in its proximal to distal segments.  8.  Remaining segments are normal.  9.  Normal left ventricular function.    Non-cardiac:  1. Cardiomegaly.  2. Small bibasilar linear parenchymal bands of subsegmental atelectasis and/or parenchymal scarring most pronounced within the left lower lobe.    INTERROGATION  ____________________________________________________________________________________________  Indication: symptomatic 2:1 AV block    Device model: [employer]				                            Functioning Mode: DDD low 60, 	    Underlying Rhythm:  2:1 HB    Pacemaker dependency: a-pace <1%; v-pacing 96%    Battery status: ABIGAIL    Interrogating parameters:   				RA		   RV	  Sense:                                       6.7                     >25  Threshold:                                 0.5                       0.4                                                            Pacing Impedance:                     567                     718                                                               Shock Impedance:                       not applicable                                                                                              Events/Alert:  None    Parameter change: 	 NONE    Assessment: normally functioning dual chamber PPM  _______________________________________________________________________________________________    ASSESSMENT & PLAN  Ms. Rose is a 77 yo F w/HTN, DM, and HLD, p/w symptomatic 2:1 AVB w/ HRs 30- 40 bpm and hypertensive to 220/90s in the ED. On 5/26 had CCTA no interventions necessary. She is now s/p dual chamber PPM (BSci) on 5/26 by Dr. Simran Patel.     - device check this AM shows normally functioning device  - AM CXR shows appropriate wire placement which is unchanged, no complications  - She will f/u with Dr. Corral' [place] office on 6/6/2022 once, then will refer to Dr. Galvan's office for EP referral closer to home in [place], as she states it is financially difficult for her to come into the [place] [facility] clinic and drive outside of [place].  - please include the following discharge instructions in your discharge paperwork:    You had a dual chamber pacemaker placed on 5/26/2022.     For the next 4 weeks, it is very important that you avoid any strenuous activities and heavy lifting (more than 5 pounds) with Left arm.     Please restrict your left arm movement to no higher than the level of your shoulder (ex: no reaching up). We encourage you to move your left arm passively, as to avoid a painful frozen shoulder. Do not pull or push your weight with your left arm.      Monitor your incision site for any active bleeding or drainage, any increase in swelling and pain despite over the counter tylenol. It is normal to see some bruising in the next few days.   You may shower normally tomorrow. Allow water to run over the incision site and do not scrub site. Do not apply direct water pressure to incision site and avoid submerging the incision in bath, pool or jacuzzi, as to decrease risk for infection.     Leave your incision open to air. Do not apply any ointments, lotions, or powders or dressings to the incision. There is a medical glue over the incision that will flake off in the next few weeks on its own. Do not pick or peel the medical glue to avoid infection and bleeding.     [employer] gave you a box with a / transmitting device to place 6 ft from your bed. Just plug device in. This will stay at your bedside and does not need to travel with you unless you are travelling abroad long term.     In your device box, you will be given a temporary device info card that you should keep in your wallet. You may show this card at the airport and before getting an MRI. Your device is MRI compatible, but for the first 6 weeks after pacemaker implantation you will not be able to get an MRI because wires have not matured to tissue yet.  A permanent card will be mailed to you at a later date.     You are scheduled to be seen by Dr. Corral on 6/6/2022 to check your device and check your incision site.      **If you have any questions or concerns please call our office at [phone]

## 2022-05-27 NOTE — DISCHARGE NOTE PROVIDER - NSDCCPCAREPLAN_GEN_ALL_CORE_FT
PRINCIPAL DISCHARGE DIAGNOSIS  Diagnosis: Heart block AV second degree  Assessment and Plan of Treatment:       SECONDARY DISCHARGE DIAGNOSES  Diagnosis: CAD (coronary artery disease)  Assessment and Plan of Treatment:     Diagnosis: Hypertension  Assessment and Plan of Treatment:     Diagnosis: HLD (hyperlipidemia)  Assessment and Plan of Treatment:     Diagnosis: Type II diabetes mellitus  Assessment and Plan of Treatment:      PRINCIPAL DISCHARGE DIAGNOSIS  Diagnosis: Heart block AV second degree  Assessment and Plan of Treatment: You were found to have 2nd degree heart block.  You underwent a [employer] pacemaker on 5/26/22.    - Follow up with Dr. Lawrence on 6/6/22 at the [facility] [place].  He will then refer you to a EP doctor closer to your home after that appointment  The pacemaker site is covered with steri-strips.  These should fall off in 2 weeks, if not you can gently pull them off after 2 weeks’ time.  Call your Electrophysiologist if your wound is red, swollen or has drainage, if you have a swollen arm or fever.  You may shower the day after discharge.  Let soap and water run over the area, do not scrub.  Pat dry.  No baths or swimming for 1 month.  No strenuous exertion or driving a car for 1 month.  Do Not raise your Right/Left arm above your Right/Left shoulder for 1 month.         SECONDARY DISCHARGE DIAGNOSES  Diagnosis: CAD (coronary artery disease)  Assessment and Plan of Treatment: You underwent a coronary CT Scan to evaluated for any coronary artery disease.  The study was poor but you were found to have a possible blockage in your right coronary artery.   - It is is recommended you undergo an outpatient nuclear stress test for further evaluation  - Please follow up with your PCP Dr. Ivey in 1-2 weeks and obtain a referral to a cardiologist in [place]  - Continue taking ASA 81mg daily  - Please increase your lipitor to 40mg daily at bedtime.    Diagnosis: Hypertension  Assessment and Plan of Treatment: Your blood pressure was elevated.    - Please START taking Norvasc 5mg daily  - please continue your home Losartan-HCTZ medicatons  - follow up with your primary care doctor in 1-2 weeks    Diagnosis: HLD (hyperlipidemia)  Assessment and Plan of Treatment: You were found to have an elevated LDL level and coronary artery diseaes on the CT Scan  - Please INCREASE your Lipitor to 40mg daily at bedtime  - Follow up with your PCP and obtain repeat lipid levels in about 3 months.    Diagnosis: Type II diabetes mellitus  Assessment and Plan of Treatment: Your diabetes is controlled.  Continue your home medications as usual  ** Of note please discuss with your PCP Dr. Ivey about the Ozempic medication as this can cause weight loss and poor appetitie.

## 2022-05-27 NOTE — DISCHARGE NOTE PROVIDER - PROVIDER TOKENS
PROVIDER:[TOKEN:[[id number]],SCHEDULEDAPPT:[06/06/2022]],FREE:[LAST:[gama],PHONE:[(   )    -],FAX:[(   )    -],ADDRESS:[PCP]]

## 2022-05-27 NOTE — PHYSICAL THERAPY INITIAL EVALUATION ADULT - ADDITIONAL COMMENTS
Patient reports she lives alone in apartment with [address] and 1 flight of stairs in apartment. PTA Patient was independent with all mobility and ADLs.

## 2022-05-27 NOTE — DISCHARGE NOTE NURSING/CASE MANAGEMENT/SOCIAL WORK - PATIENT PORTAL LINK FT
You can access the FollowMyHealth Patient Portal offered by [facility] by registering at the following website: [email or web address]. By joining [employer]s FollowMyHealth portal, you will also be able to view your health information using other applications (apps) compatible with our system.

## 2022-05-31 PROBLEM — E11.9 TYPE 2 DIABETES MELLITUS WITHOUT COMPLICATIONS: Chronic | Status: ACTIVE | Noted: 2022-05-25

## 2022-05-31 PROBLEM — I10 ESSENTIAL (PRIMARY) HYPERTENSION: Chronic | Status: ACTIVE | Noted: 2022-05-25

## 2022-05-31 PROBLEM — E78.5 HYPERLIPIDEMIA, UNSPECIFIED: Chronic | Status: ACTIVE | Noted: 2022-05-25

## 2022-06-02 DIAGNOSIS — E78.5 HYPERLIPIDEMIA, UNSPECIFIED: ICD-10-CM

## 2022-06-02 DIAGNOSIS — I08.0 RHEUMATIC DISORDERS OF BOTH MITRAL AND AORTIC VALVES: ICD-10-CM

## 2022-06-02 DIAGNOSIS — Z79.899 OTHER LONG TERM (CURRENT) DRUG THERAPY: ICD-10-CM

## 2022-06-02 DIAGNOSIS — I12.9 HYPERTENSIVE CHRONIC KIDNEY DISEASE WITH STAGE 1 THROUGH STAGE 4 CHRONIC KIDNEY DISEASE, OR UNSPECIFIED CHRONIC KIDNEY DISEASE: ICD-10-CM

## 2022-06-02 DIAGNOSIS — I44.1 ATRIOVENTRICULAR BLOCK, SECOND DEGREE: ICD-10-CM

## 2022-06-02 DIAGNOSIS — R63.0 ANOREXIA: ICD-10-CM

## 2022-06-02 DIAGNOSIS — E11.22 TYPE 2 DIABETES MELLITUS WITH DIABETIC CHRONIC KIDNEY DISEASE: ICD-10-CM

## 2022-06-02 DIAGNOSIS — I25.10 ATHEROSCLEROTIC HEART DISEASE OF NATIVE CORONARY ARTERY WITHOUT ANGINA PECTORIS: ICD-10-CM

## 2022-06-02 DIAGNOSIS — Z79.82 LONG TERM (CURRENT) USE OF ASPIRIN: ICD-10-CM

## 2022-06-02 DIAGNOSIS — D63.1 ANEMIA IN CHRONIC KIDNEY DISEASE: ICD-10-CM

## 2022-06-02 DIAGNOSIS — N18.30 CHRONIC KIDNEY DISEASE, STAGE 3 UNSPECIFIED: ICD-10-CM

## 2022-06-02 DIAGNOSIS — I16.0 HYPERTENSIVE URGENCY: ICD-10-CM

## 2022-06-06 ENCOUNTER — NON-APPOINTMENT (OUTPATIENT)
Age: 77
End: 2022-06-06

## 2022-06-06 ENCOUNTER — APPOINTMENT (OUTPATIENT)
Dept: HEART AND VASCULAR | Facility: CLINIC | Age: 77
End: 2022-06-06
Payer: MEDICARE

## 2022-06-06 VITALS
DIASTOLIC BLOOD PRESSURE: 70 MMHG | HEIGHT: 60 IN | BODY MASS INDEX: 25.52 KG/M2 | WEIGHT: 130 LBS | SYSTOLIC BLOOD PRESSURE: 163 MMHG

## 2022-06-06 DIAGNOSIS — Z78.9 OTHER SPECIFIED HEALTH STATUS: ICD-10-CM

## 2022-06-06 PROCEDURE — 93280 PM DEVICE PROGR EVAL DUAL: CPT

## 2022-06-07 PROBLEM — Z78.9 DOES NOT USE ILLICIT DRUGS: Status: ACTIVE | Noted: 2022-06-07

## 2022-06-07 NOTE — PROCEDURE
[No] : not [Pacemaker] : pacemaker [DDD] : DDD [Threshold Testing Performed] : Threshold testing was performed [Lead Imp:  ___ohms] : lead impedance was [unfilled] ohms [Sensing Amplitude ___mv] : sensing amplitude was [unfilled] mv [___V @] : [unfilled] V [___ ms] : [unfilled] ms [Outputs/Safety Margin] : output changed to allow for adequate safety margin [2nd Degree Block] : second degree block [de-identified] : [facility]  [de-identified] : [facility]olade MRI [de-identified] : 2022 [de-identified] :  [de-identified] : 13.5 years [de-identified] : AP 1%\par  96%\par no events

## 2022-06-07 NOTE — HISTORY OF PRESENT ILLNESS
[de-identified] : 77 year old female with HTN, HLD, DM, CAD s/p prior PCI LAD and 2:1 heart block s/p pacemaker, who presents for follow up.\par \par She presented to [facility] 5/2022 with fatigue and was found to be in 2-1 heart block. s/p pacemaker.  Since then she feels well.  She states she was active before and she remains active now.  No device related complaints.  No palpitations, chest pain, syncope or edema.\par  \par

## 2022-06-07 NOTE — DISCUSSION/SUMMARY
[Pacemaker Function Normal] : normal pacemaker function [FreeTextEntry1] : 77 year old female with HTN, HLD, DM, CAD s/p prior PCI LAD and 2:1 heart block s/p pacemaker, who presents for follow up. Device incision is well healed.  Interrogation reveals normal function and all measured data is within normal limits.  Outputs decreased to optimize battery life.  She will follow up in 6 months or sooner if needed and knows to call with any questions or concerns.\par

## 2022-10-26 ENCOUNTER — APPOINTMENT (OUTPATIENT)
Dept: HEART AND VASCULAR | Facility: CLINIC | Age: 77
End: 2022-10-26

## 2022-10-26 VITALS — SYSTOLIC BLOOD PRESSURE: 140 MMHG | HEART RATE: 65 BPM | DIASTOLIC BLOOD PRESSURE: 60 MMHG

## 2022-10-26 DIAGNOSIS — E11.9 TYPE 2 DIABETES MELLITUS W/OUT COMPLICATIONS: ICD-10-CM

## 2022-10-26 DIAGNOSIS — E78.5 HYPERLIPIDEMIA, UNSPECIFIED: ICD-10-CM

## 2022-10-26 DIAGNOSIS — I10 ESSENTIAL (PRIMARY) HYPERTENSION: ICD-10-CM

## 2022-10-26 DIAGNOSIS — Z86.79 PERSONAL HISTORY OF OTHER DISEASES OF THE CIRCULATORY SYSTEM: ICD-10-CM

## 2022-10-26 PROCEDURE — 99214 OFFICE O/P EST MOD 30 MIN: CPT

## 2022-10-26 PROCEDURE — 93000 ELECTROCARDIOGRAM COMPLETE: CPT

## 2022-10-26 NOTE — DISCUSSION/SUMMARY
[FreeTextEntry1] : 77F, hx of 2:1 heart block s/p ppm in May, 2022, hx of CAD s/p pci in the past, htn, dm-2, hpl here to establish care\par \par Heart Block s/p PPM: No recent cardiopulmonary symptoms. device checked in june 2022 working normally. today's ecg shows pt is paced. ppm site clean dry and intact\par \par CAD s/p PCI: no recent cardiopulmonary symptoms. Recent TTE normal LV funct. mild-mod AS will cont to monitor\par \par HPL: last lipid panel in acceptable range. cont with current meds\par \par HTN: BP within acceptble range. cont with current meds

## 2022-10-26 NOTE — PHYSICAL EXAM
[Normal] : soft, non-tender, no masses/organomegaly, normal bowel sounds [No Rash] : no rash [de-identified] : nonpitting edema of b/l ankles/feet

## 2022-10-26 NOTE — ED ADULT TRIAGE NOTE - TEMPERATURE IN FAHRENHEIT (DEGREES F)
Stable and controlled  Continue medication as documented in your medication list  Trazodone 50 mg nightly 89.2

## 2022-10-26 NOTE — HISTORY OF PRESENT ILLNESS
[FreeTextEntry1] : 77-year-old female history of hypertension, hyperlipidemia, diabetes, CAD history of PCI, Mobitz type I heart block at [facility] May 26, 2022 with permanent pacemaker insertion. Today Patient comes to the office to establish care. Patient denies HA, chest pain, dyspnea on exertion, palpitations, fever chills [place] falls \par \par MEDICATIONS:\par Aspirin 81 MG Oral Tablet Delayed Release\par Atorvastatin Calcium 40 MG Oral Tablet\par Ezetimibe (Zetia) 10 MG Oral Tablet\par Furosemide 20 MG Oral Tablet\par Insulin Glargine (Basaglar KwikPen) 36 UNITS DAILY\par Insulin Lispro (HumaLOG KwikPen) SLIDING SCALE\par Losartan Potassium & Hydrochlorothiazide (Losartan Potassium-HCTZ) 100-25 MG Oral Tablet\par Mirtazapine 15 MG Oral Tablet\par Nebivolol HCl 10 MG Oral Tablet\par Oxybutynin Chloride 5 MG Oral Tablet\par Sitagliptin-Metformin HCl (Janumet)  MG Oral Tablet

## 2023-03-22 ENCOUNTER — APPOINTMENT (OUTPATIENT)
Dept: HEART AND VASCULAR | Facility: CLINIC | Age: 78
End: 2023-03-22
Payer: MEDICARE

## 2023-03-22 PROCEDURE — 93000 ELECTROCARDIOGRAM COMPLETE: CPT

## 2023-03-22 PROCEDURE — 99214 OFFICE O/P EST MOD 30 MIN: CPT

## 2023-04-02 NOTE — DISCUSSION/SUMMARY
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 device checked in june 2022 working normally. today's ecg shows pt is paced. ppm site clean dry and intact\par \par CAD s/p PCI: no recent cardiopulmonary symptoms. Recent TTE normal LV funct. mild-mod AS will cont to monitor\par \par HPL: last lipid panel in acceptable range. cont with current meds\par \par HTN: BP slightly higher today but within acceptable range. cont with current meds. Pt reports compliance. Nervous today.\par \par DM-2: Last HbA1C from 2/9/2023 shows value of 9.8%; Higher than before. Pt reports that she may have missed some medication dosages and has not watched what she eats closely. Advised pt on the risks of DM-2. She will attend to her ADA diet and will work with her PMD on continuing to take meds regularly. \par --F/U with PMD regarding medication dosing for DM. \par \par CKD: Last Cr 1.3; Baseline. Pt reports good urine output. Cont to monitor. PMD to select renal doc for f/u\par \par RTC in 4-6 months

## 2023-04-02 NOTE — HISTORY OF PRESENT ILLNESS
[FreeTextEntry1] : 77-year-old female history of hypertension, hyperlipidemia, diabetes, CKD 3,  CAD history of PCI, Mobitz type I heart block at [facility] May 26, 2022 with permanent pacemaker insertion. Today Patient comes to the off for followup. Patient denies HA, chest pain, dyspnea on exertion, palpitations, fever chills NV falls \par \par VS:\par BP: 152/75\par HR 88\par \par ECG: Vpaced\par \par MEDICATIONS:\par Aspirin 81 MG Oral Tablet Delayed Release\par Atorvastatin Calcium 40 MG Oral Tablet\par Ezetimibe (Zetia) 10 MG Oral Tablet\par Furosemide 20 MG Oral Tablet\par Insulin Glargine (Basaglar KwikPen) 36 UNITS DAILY\par Insulin Lispro (HumaLOG KwikPen) SLIDING SCALE\par Losartan Potassium & Hydrochlorothiazide (Losartan Potassium-HCTZ) 100-25 MG Oral Tablet\par Mirtazapine 15 MG Oral Tablet\par Nebivolol HCl 10 MG Oral Tablet\par Oxybutynin Chloride 5 MG Oral Tablet\par Sitagliptin-Metformin HCl (Janumet)  MG Oral Tablet

## 2023-06-29 PROBLEM — I45.9 HEART BLOCK: Status: ACTIVE | Noted: 2022-06-07

## 2023-06-29 PROBLEM — Z95.0 S/P CARDIAC PACEMAKER PROCEDURE: Status: ACTIVE | Noted: 2022-10-26

## 2023-07-03 ENCOUNTER — APPOINTMENT (OUTPATIENT)
Dept: HEART AND VASCULAR | Facility: CLINIC | Age: 78
End: 2023-07-03
Payer: MEDICARE

## 2023-07-03 VITALS
HEIGHT: 60 IN | SYSTOLIC BLOOD PRESSURE: 158 MMHG | HEART RATE: 65 BPM | DIASTOLIC BLOOD PRESSURE: 72 MMHG | BODY MASS INDEX: 28.86 KG/M2 | OXYGEN SATURATION: 98 % | WEIGHT: 147 LBS

## 2023-07-03 DIAGNOSIS — I45.9 CONDUCTION DISORDER, UNSPECIFIED: ICD-10-CM

## 2023-07-03 DIAGNOSIS — Z95.0 PRESENCE OF CARDIAC PACEMAKER: ICD-10-CM

## 2023-07-03 PROCEDURE — 93280 PM DEVICE PROGR EVAL DUAL: CPT

## 2023-07-03 PROCEDURE — 99212 OFFICE O/P EST SF 10 MIN: CPT

## 2023-07-03 NOTE — DISCUSSION/SUMMARY
[Pacemaker Function Normal] : normal pacemaker function [FreeTextEntry1] : 78 year old female with HTN, HLD, DM, CAD s/p prior PCI LAD and 2:1 heart block s/p pacemaker, who presents for follow up.\par \par #Device interrogation\par  Device incision is well healed.  Interrogation reveals normal function and all measured data is within normal limits.  Outputs decreased to optimize battery life. \par She is now pacemaker dependent. \par Will set up remote monitoring. Follow up in person in 1 year.

## 2023-07-03 NOTE — HISTORY OF PRESENT ILLNESS
[de-identified] : 78 year old female with HTN, HLD, DM, CAD s/p prior PCI LAD and 2:1 heart block s/p pacemaker, who presents for follow up.\par \par She presented to [facility] 5/2022 with fatigue and was found to be in 2-1 heart block. s/p pacemaker.  Since then she feels well.  She states she was active before and she remains active now. COmplains of mild leg swelling.  No device related complaints.  No palpitations, chest pain, syncope.\par  \par

## 2023-07-03 NOTE — ADDENDUM
[FreeTextEntry1] : I, Hyacinth Og, am scribing for and the presence of Dr. Corral the following sections HISTORY OF PRESENT ILLNESSS, CARDIOLOGY SUMMARY, ACTIVE PROBLEMS, PAST MEDICAL/FAMILY/SOCIAL HISTORY; REVIEW OF SYSTEMS; VITAL SIGNS; PHYSICAL EXAM, PROCEDURE, DISCUSSION

## 2023-07-03 NOTE — PROCEDURE
[Pacemaker] : pacemaker [DDD] : DDD [Threshold Testing Performed] : Threshold testing was performed [Sensing Amplitude ___mv] : sensing amplitude was [unfilled] mv [Lead Imp:  ___ohms] : lead impedance was [unfilled] ohms [___V @] : [unfilled] V [___ ms] : [unfilled] ms [Outputs/Safety Margin] : output changed to allow for adequate safety margin [Complete Heart Block] : complete heart block [Atrial] : Atrial [Ventricular] : Ventricular [Auto Capture "On"] : auto capture was switched "On" [Auto Intrinsic Cond. Search On] : auto intrinsic conduction search switched on [Programmed for Longevity] : output reprogrammed for improved battery longevity [de-identified] : [facility]  [de-identified] : accolade MRI [de-identified] : 2022 [de-identified] :  [de-identified] : 13.5 years [de-identified] : AP 1%\par %\par Dependent \par no events

## 2023-07-03 NOTE — REVIEW OF SYSTEMS
[Negative] : Musculoskeletal [Fever] : no fever [Chills] : no chills [SOB] : no shortness of breath [Dyspnea on exertion] : not dyspnea during exertion [Palpitations] : no palpitations [Syncope] : no syncope

## 2024-08-01 ENCOUNTER — APPOINTMENT (OUTPATIENT)
Dept: HEART AND VASCULAR | Facility: CLINIC | Age: 79
End: 2024-08-01
Payer: MEDICARE

## 2024-08-01 ENCOUNTER — NON-APPOINTMENT (OUTPATIENT)
Age: 79
End: 2024-08-01

## 2024-08-01 VITALS
HEIGHT: 60 IN | WEIGHT: 157 LBS | SYSTOLIC BLOOD PRESSURE: 161 MMHG | BODY MASS INDEX: 30.82 KG/M2 | HEART RATE: 82 BPM | DIASTOLIC BLOOD PRESSURE: 64 MMHG | OXYGEN SATURATION: 98 %

## 2024-08-01 DIAGNOSIS — I45.9 CONDUCTION DISORDER, UNSPECIFIED: ICD-10-CM

## 2024-08-01 DIAGNOSIS — I10 ESSENTIAL (PRIMARY) HYPERTENSION: ICD-10-CM

## 2024-08-01 DIAGNOSIS — E11.9 TYPE 2 DIABETES MELLITUS W/OUT COMPLICATIONS: ICD-10-CM

## 2024-08-01 DIAGNOSIS — I25.10 ATHEROSCLEROTIC HEART DISEASE OF NATIVE CORONARY ARTERY W/OUT ANGINA PECTORIS: ICD-10-CM

## 2024-08-01 DIAGNOSIS — I35.0 NONRHEUMATIC AORTIC (VALVE) STENOSIS: ICD-10-CM

## 2024-08-01 DIAGNOSIS — M79.89 OTHER SPECIFIED SOFT TISSUE DISORDERS: ICD-10-CM

## 2024-08-01 DIAGNOSIS — E78.5 HYPERLIPIDEMIA, UNSPECIFIED: ICD-10-CM

## 2024-08-01 PROCEDURE — 99215 OFFICE O/P EST HI 40 MIN: CPT

## 2024-08-01 PROCEDURE — 93000 ELECTROCARDIOGRAM COMPLETE: CPT

## 2024-08-01 PROCEDURE — G2211 COMPLEX E/M VISIT ADD ON: CPT

## 2024-08-01 RX ORDER — OXYBUTYNIN CHLORIDE 5 MG/1
5 TABLET, EXTENDED RELEASE ORAL
Refills: 0 | Status: ACTIVE | COMMUNITY

## 2024-08-01 RX ORDER — ATORVASTATIN CALCIUM 40 MG/1
40 TABLET, FILM COATED ORAL
Refills: 0 | Status: ACTIVE | COMMUNITY

## 2024-08-01 RX ORDER — EZETIMIBE 10 MG/1
10 TABLET ORAL
Refills: 0 | Status: ACTIVE | COMMUNITY

## 2024-08-01 RX ORDER — INSULIN LISPRO 100 [IU]/ML
100 INJECTION, SOLUTION INTRAVENOUS; SUBCUTANEOUS
Refills: 0 | Status: ACTIVE | COMMUNITY

## 2024-08-01 RX ORDER — ASPIRIN 81 MG
81 TABLET, DELAYED RELEASE (ENTERIC COATED) ORAL DAILY
Refills: 0 | Status: ACTIVE | COMMUNITY

## 2024-08-01 RX ORDER — EMPAGLIFLOZIN 25 MG/1
25 TABLET, FILM COATED ORAL DAILY
Refills: 0 | Status: ACTIVE | COMMUNITY

## 2024-08-01 RX ORDER — NEBIVOLOL 10 MG/1
10 TABLET ORAL DAILY
Refills: 0 | Status: ACTIVE | COMMUNITY

## 2024-08-01 RX ORDER — INSULIN DEGLUDEC INJECTION 100 U/ML
100 INJECTION, SOLUTION SUBCUTANEOUS
Refills: 0 | Status: ACTIVE | COMMUNITY

## 2024-08-01 RX ORDER — METFORMIN HYDROCHLORIDE 500 MG/1
500 TABLET, COATED ORAL TWICE DAILY
Qty: 60 | Refills: 2 | Status: ACTIVE | COMMUNITY

## 2024-08-01 RX ORDER — AMLODIPINE BESYLATE 10 MG/1
10 TABLET ORAL DAILY
Refills: 0 | Status: ACTIVE | COMMUNITY

## 2024-08-01 RX ORDER — MIRTAZAPINE 15 MG/1
15 TABLET, FILM COATED ORAL
Refills: 0 | Status: ACTIVE | COMMUNITY

## 2024-08-01 NOTE — HISTORY OF PRESENT ILLNESS
[FreeTextEntry1] : 78 year old female with HTN, HLD, DM, CAD s/p prior PCI LAD and 2:1 heart block s/p pacemaker.   No cp/sob/palp/dizziness/falls/syncope.  Notes chronic leg swelling.   No orthopnea.

## 2024-08-01 NOTE — PHYSICAL EXAM
[Well Developed] : well developed [Well Nourished] : well nourished [No Acute Distress] : no acute distress [Normal Conjunctiva] : normal conjunctiva [Normal Venous Pressure] : normal venous pressure [No Carotid Bruit] : no carotid bruit [Normal S1, S2] : normal S1, S2 [No Rub] : no rub [No Gallop] : no gallop [Clear Lung Fields] : clear lung fields [Good Air Entry] : good air entry [No Respiratory Distress] : no respiratory distress  [Soft] : abdomen soft [Non Tender] : non-tender [No Masses/organomegaly] : no masses/organomegaly [Normal Bowel Sounds] : normal bowel sounds [Moves all extremities] : moves all extremities [No Focal Deficits] : no focal deficits [Normal Speech] : normal speech [No ulcers] : no ulcers [No varicosities] : no varicosities [No chronic venous stasis changes] : no chronic venous stasis changes [No cyanosis] : no cyanosis [No rashes] : no rashes [Normal peripheral pulses] : : normal peripheral pulses [de-identified] : AS murmur  [de-identified] : trace b/l pitting edema

## 2024-08-01 NOTE — ASSESSMENT
[FreeTextEntry1] : 79F female with HTN, HLD, DM, CAD s/p prior PCI LAD and 2:1 heart block s/p pacemaker, aortic stenosis, chronic leg swelling.  EKG probably sinus v-paced rate 70  Prior records personally reviewed  - needs device check, will set up care with Dr. Sanchez  - check labs, including BNP, may warrant diuretic - obtain TTE given leg swelling, mild/mod AS, chronic v-pacing.  Lungs clear, AS murmur, trace b/l pitting edema - home BP logs are in good range - will not adjust meds a this time.  Leg swelling maybe in part due high dose norvasc.  If no evidence for HF will titrate down on CCB.  Monitor BPs at home.

## 2024-08-02 LAB
ALBUMIN SERPL ELPH-MCNC: 4.2 G/DL
ALP BLD-CCNC: 76 U/L
ALT SERPL-CCNC: 23 U/L
ANION GAP SERPL CALC-SCNC: 15 MMOL/L
AST SERPL-CCNC: 34 U/L
BILIRUB SERPL-MCNC: 0.3 MG/DL
BUN SERPL-MCNC: 17 MG/DL
CALCIUM SERPL-MCNC: 9.2 MG/DL
CHLORIDE SERPL-SCNC: 108 MMOL/L
CO2 SERPL-SCNC: 18 MMOL/L
CREAT SERPL-MCNC: 1.15 MG/DL
EGFR: 48 ML/MIN/1.73M2
ESTIMATED AVERAGE GLUCOSE: 163 MG/DL
GLUCOSE SERPL-MCNC: 135 MG/DL
HBA1C MFR BLD HPLC: 7.3 %
HCT VFR BLD CALC: 39.6 %
HGB BLD-MCNC: 11.6 G/DL
MCHC RBC-ENTMCNC: 28 PG
MCHC RBC-ENTMCNC: 29.3 GM/DL
MCV RBC AUTO: 95.4 FL
NT-PROBNP SERPL-MCNC: 209 PG/ML
PLATELET # BLD AUTO: 245 K/UL
POTASSIUM SERPL-SCNC: 4.4 MMOL/L
PROT SERPL-MCNC: 7.9 G/DL
RBC # BLD: 4.15 M/UL
RBC # FLD: 14.5 %
SODIUM SERPL-SCNC: 141 MMOL/L
TSH SERPL-ACNC: 1.79 UIU/ML
WBC # FLD AUTO: 5.8 K/UL

## 2024-08-09 ENCOUNTER — APPOINTMENT (OUTPATIENT)
Dept: HEART AND VASCULAR | Facility: CLINIC | Age: 79
End: 2024-08-09

## 2024-08-09 PROBLEM — I44.30 HEART BLOCK, AV: Status: ACTIVE | Noted: 2024-08-09

## 2024-08-09 PROCEDURE — 93280 PM DEVICE PROGR EVAL DUAL: CPT

## 2024-08-09 PROCEDURE — 99213 OFFICE O/P EST LOW 20 MIN: CPT

## 2024-08-09 NOTE — HISTORY OF PRESENT ILLNESS
[de-identified] : 79 year old female with HTN, HLD, DM, CAD s/p prior PCI LAD and 2:1 heart block s/p pacemaker.  She has no complaints today - The patient denies chest pain, SOB, RAMIREZ, palpitation, light headedness, syncope or change in exertional capacity.  She presented to [facility] 5/2022 with fatigue and was found to be in 2-1 heart block. s/p pacemaker ([email or web address])

## 2024-08-09 NOTE — PROCEDURE
[Complete Heart Block] : complete heart block [Pacemaker] : pacemaker [DDD] : DDD [Threshold Testing Performed] : Threshold testing was performed [Atrial] : Atrial [Ventricular] : Ventricular [Sensing Amplitude ___mv] : sensing amplitude was [unfilled] mv [Lead Imp:  ___ohms] : lead impedance was [unfilled] ohms [___V @] : [unfilled] V [___ ms] : [unfilled] ms [Outputs/Safety Margin] : output changed to allow for adequate safety margin [Auto Capture "On"] : auto capture was switched "On" [Auto Intrinsic Cond. Search On] : auto intrinsic conduction search switched on [Programmed for Longevity] : output reprogrammed for improved battery longevity [de-identified] : [facility]  [de-identified] : [employer] MRI [de-identified] : 2022 [de-identified] :  [de-identified] : 13.5 years [de-identified] : AP 1%\par  %\par  Dependent \par  no events

## 2024-11-05 NOTE — DIETITIAN INITIAL EVALUATION ADULT - WEIGHT USED FOR CALCULATIONS
normal appearance , without tenderness upon palpation , no deformities , trachea midline , Thyroid normal size , no masses , thyroid nontender
current weight

## 2024-11-07 ENCOUNTER — APPOINTMENT (OUTPATIENT)
Dept: HEART AND VASCULAR | Facility: CLINIC | Age: 79
End: 2024-11-07

## 2025-01-21 ENCOUNTER — APPOINTMENT (OUTPATIENT)
Dept: HEART AND VASCULAR | Facility: CLINIC | Age: 80
End: 2025-01-21
Payer: MEDICARE

## 2025-01-21 ENCOUNTER — NON-APPOINTMENT (OUTPATIENT)
Age: 80
End: 2025-01-21

## 2025-01-21 VITALS
OXYGEN SATURATION: 96 % | DIASTOLIC BLOOD PRESSURE: 62 MMHG | BODY MASS INDEX: 30.82 KG/M2 | HEART RATE: 67 BPM | HEIGHT: 60 IN | SYSTOLIC BLOOD PRESSURE: 138 MMHG | WEIGHT: 157 LBS

## 2025-01-21 DIAGNOSIS — Z95.0 PRESENCE OF CARDIAC PACEMAKER: ICD-10-CM

## 2025-01-21 DIAGNOSIS — I44.30 UNSPECIFIED ATRIOVENTRICULAR BLOCK: ICD-10-CM

## 2025-01-21 PROCEDURE — 99213 OFFICE O/P EST LOW 20 MIN: CPT | Mod: 25

## 2025-01-21 PROCEDURE — 93280 PM DEVICE PROGR EVAL DUAL: CPT

## 2025-04-30 NOTE — PHYSICAL EXAM
Continue current warfarin dose of 7.5 mg daily (52.5 mg/week).  Recheck INR in 3 weeks.   [Normal] : soft, non-tender, no masses/organomegaly, normal bowel sounds [No Rash] : no rash [de-identified] : nonpitting edema of b/l ankles/feet

## 2025-07-15 ENCOUNTER — NON-APPOINTMENT (OUTPATIENT)
Age: 80
End: 2025-07-15

## 2025-07-15 ENCOUNTER — APPOINTMENT (OUTPATIENT)
Dept: HEART AND VASCULAR | Facility: CLINIC | Age: 80
End: 2025-07-15
Payer: MEDICARE

## 2025-07-15 VITALS
HEIGHT: 60 IN | SYSTOLIC BLOOD PRESSURE: 158 MMHG | OXYGEN SATURATION: 98 % | BODY MASS INDEX: 30.04 KG/M2 | WEIGHT: 153 LBS | HEART RATE: 66 BPM | DIASTOLIC BLOOD PRESSURE: 60 MMHG

## 2025-07-15 PROCEDURE — 93280 PM DEVICE PROGR EVAL DUAL: CPT

## 2025-07-15 PROCEDURE — 99213 OFFICE O/P EST LOW 20 MIN: CPT

## 2025-07-15 RX ORDER — OMEPRAZOLE 40 MG/1
40 CAPSULE, DELAYED RELEASE ORAL
Refills: 0 | Status: ACTIVE | COMMUNITY

## 2025-07-15 RX ORDER — HYDRALAZINE HYDROCHLORIDE 100 MG/1
100 TABLET ORAL
Refills: 0 | Status: ACTIVE | COMMUNITY